# Patient Record
Sex: FEMALE | Race: WHITE | Employment: OTHER | URBAN - METROPOLITAN AREA
[De-identification: names, ages, dates, MRNs, and addresses within clinical notes are randomized per-mention and may not be internally consistent; named-entity substitution may affect disease eponyms.]

---

## 2021-02-19 ENCOUNTER — HOSPITAL ENCOUNTER (OUTPATIENT)
Dept: PHYSICAL THERAPY | Age: 61
Discharge: HOME | End: 2021-02-19
Payer: COMMERCIAL

## 2021-02-19 PROCEDURE — 97110 THERAPEUTIC EXERCISES: CPT

## 2021-02-19 PROCEDURE — 97165 OT EVAL LOW COMPLEX 30 MIN: CPT
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
## 2021-02-19 NOTE — PROGRESS NOTES
Ethan  : 1960  Primary: [place]  Secondary:  [address] at [address] [facility] 68, [address], [place], [facility] W [facility]  [phone]   [phone]      OUTPATIENT OCCUPATIONAL THERAPY: Daily Treatment Note 2021  Visit Count:  1    ICD-10: Treatment Diagnosis: Pain in left hand (H04.746)  Precautions/Allergies:   Patient has no allergy information on record. TREATMENT PLAN:  Effective Dates: 2021 TO 2021 (90 days). Frequency/Duration: 2 times a week for 90 Day(s)    Pre-treatment Symptoms/Complaints:    Pain: Initial: Pain Intensity 1: (severe)  Pain Location 1: Hand  Pain Orientation 1: Left  Post Session:  4/10   Medications Last Reviewed:  2021   Updated Objective Findings:  See evaluation note from today   TREATMENT:     Therapeutic Exercise: (  8 minutes):  Exercises per grid below to improve mobility, strength and coordination. Required minimal visual, verbal, manual and tactile cues to promote proper body alignment, promote proper body posture and promote proper body mechanics. Progressed resistance, range, repetitions and complexity of movement as indicated. Date:   Date:   Date:     Activity/Exercise Parameters Parameters Parameters   IP flexion extension 1. Individual fingers 1-2 sets 3-5 reps AROM. Straight fist 1-2 sets 3-5 reps AROM. Digit abduction adduction Little 1-2 sets 4-5 reps AROM. Home program: As above. [employer] Portal  Treatment/Session Summary:  Ethan tolerated the above exercises without significant complaints - she called later and said her pain was up to an 8 (from 4) and was encouraged to rest and complete exercises as tolerated. She was also encouraged to ice/rest/elevate her hand. Her small finger was macerated on the radial PIP joint due to giuliana taping, so she was also encouraged to Ml Loupe it out. \"  A thin tubular guaze was placed PATIENT [facility]  SHE SAYS HER RECORDS WAS SENT BUT THEY NEED A REFERRAL FORM.  SHE ALSON NEEDS A REFILL OF HER PAIN MEDICATION UNTIL SHE CAN GET IN TO SEE HIM THE END OF July.  SHE HAS BEEN TAKING PERCOCET 10/325, BUT IT MAKES HER SICK TO HER STOMACH.  SHE NEEDS A LOWER DOSAGE OR PHENEGREN  TO TAKE WITH IT.   over DIP/PIP joints to proximal phalanx. She was shown how to buddy tape her ring/little fingers with gauze. She was encouraged not to buddy tape her index and middle fingers as she did prior to today due to a stiff middle PIP joint. She was given an HEP with this therapist's e-mail address for further questions as needed. · Response to Treatment:  tolerated well without complications. · Communication/Consultation:  MD sent eval/patient called after treatment (see above). · Equipment provided today:  None today  · Recommendations/Intent for next treatment session: Next visit will focus on advancement to more challenging activities.     Total Treatment Billable Duration:  30 minutes  OT Patient Time In/Time Out  Time In: 0930  Time Out: 1015  Kareem Persaud OTR/L    Future Appointments   Date Time Provider Aleksander Hansen   2/23/2021  3:15 PM Akbar Santillan OTR/L [facility] SFE   2/25/2021  9:30 AM George Thurston, OTD, OTR/L [facility] SFE   3/1/2021  9:30 AM George Thurston, OTD, OTR/L [facility] SFD   3/5/2021  9:30 AM George Thurston, OTD, OTR/L [facility] SFD   3/8/2021  9:30 AM George Thurston, OTD, OTR/L [facility] SFD   3/12/2021  9:30 AM George Thurston, OTD, OTR/L [facility] SFD   3/17/2021  3:15 PM George Thurston, OTD, OTR/L [facility] SFD   3/19/2021  9:30 AM George Thurston, OTD, OTR/L [facility] SFD   3/22/2021  9:30 AM George Thurston, OTD, OTR/L [facility] SFD   3/26/2021  9:30 AM George Thurston, OTD, OTR/L [facility] SFD   3/29/2021  9:30 AM George Thurston, OTD, OTR/L [facility]

## 2021-02-23 ENCOUNTER — HOSPITAL ENCOUNTER (OUTPATIENT)
Dept: PHYSICAL THERAPY | Age: 61
Discharge: HOME | End: 2021-02-23
Payer: COMMERCIAL

## 2021-02-23 PROCEDURE — 97110 THERAPEUTIC EXERCISES: CPT

## 2021-02-23 PROCEDURE — 97140 MANUAL THERAPY 1/> REGIONS: CPT

## 2021-02-23 NOTE — PROGRESS NOTES
Ethan  : 1960  Primary:   Secondary:  [address]  at [facility]  [address], [place] [address] [address] [facility]  [phone]   [phone]      OUTPATIENT OCCUPATIONAL THERAPY: Daily Treatment Note 2021  Visit Count:  2    ICD-10: Treatment Diagnosis: Pain in left hand (C14.031)  Precautions/Allergies:   Patient has no allergy information on record. TREATMENT PLAN:  Effective Dates: 2021 TO 2021 (90 days). Frequency/Duration: 2 times a week for 90 Day(s)    Pre-treatment Symptoms/Complaints:    Pain: Initial: Pain Intensity 1: 2  Post Session:  \"good\"/10   Medications Last Reviewed:  2021   Updated Objective Findings:  See evaluation note from today   TREATMENT:   MODALITIES: (0-10 minutes): *  Hot Pack Therapy in order to provide analgesia and improve tissue extensibility in preparation for therapeutic exercises. Placed heat pack with 6-8 layers in between on dorsum of hand and assisted fingers into flexion. See below. Manual Therapy: (23 minutes): Soft tissue mobilization was utilized and necessary because of the patient's restricted joint motion and restricted motion of soft tissue. Completed gentle scar/retrograde massage secondary to hypersensitivity and edema. Ring, pinky and middle fingers were wrapped with co-band distal to proximally with 50% tension then placed around distal metacarpal to reduce edema. Therapeutic Exercise: (  30 minutes):  Exercises per grid below to improve mobility, strength and coordination. Required minimal visual, verbal, manual and tactile cues to promote proper body alignment, promote proper body posture and promote proper body mechanics. Progressed resistance, range, repetitions and complexity of movement as indicated. Date:   Date:   Date:     Activity/Exercise Parameters Parameters Parameters   IP flexion extension 1. Individual fingers 1-2 sets 3-5 reps AROM.  1. Individual fingers 3-4 sets 5-10 reps AROM. 2.     Reverse blocking   1. 3-4 sets 3-5 reps AROM. Gentle extrinsic finger extensor stretch  With heat 2-3 sets held 2-3 minutes. Straight fist 1-2 sets 3-5 reps AROM. Digit abduction adduction Little 1-2 sets 4-5 reps AROM. Composite 1-2 sets 4-5 reps AROM. Composite flexion extension   2-3 sets 5-10 reps AROM down to co-band roll (just cardboard). Home program: As above. [facility] Portal  Treatment/Session Summary:  Charissa Knight tolerated the above exercises without significant complaints. She is flexing her PIP joint of SF to at least 60° and was able to almost fully extend her ring finger by the end of the session (was at -40°). She was given a 'template' for composite flexion exercises and reverse blocking exercises were added to improve extension. Continue OT per plan of care. · Response to Treatment:  tolerated well without complications. · Communication/Consultation:  None today  · Equipment provided today:  Composite flexion 'template.'   · Recommendations/Intent for next treatment session: Next visit will focus on advancement to more challenging activities.     Total Treatment Billable Duration:  55 minutes  OT Patient Time In/Time Out  Time In: 8460  Time Out: 1610  Priya Sousa OTR/L    Future Appointments   Date Time Provider Aleksander Hansen   2/25/2021  9:30 AM Dee Ovalle OTD, OTR/L [facility] SFE   3/1/2021  9:30 AM CHARLIE Mera, OTR/L [facility] SFD   3/5/2021  9:30 AM Dee Ovalle OTD, OTR/L [facility] SFD   3/8/2021  9:30 AM Dee Ovalle OTVERO, OTR/L [facility] SFD   3/12/2021  9:30 AM eDe Ovalle OTD, OTR/L [facility] SFD   3/17/2021  3:15 PM Dee Ovalle OTD, OTR/L [facility] SFD   3/19/2021  9:30 AM Dee Ovalle OTD, OTR/L [facility] SFD   3/22/2021  9:30 [facility]   3/26/2021  9:30 AM [facility]era[facility]   3/29/2021  9:30 AM CHARLIE Mera, OTR/L [facility]

## 2021-02-25 ENCOUNTER — HOSPITAL ENCOUNTER (OUTPATIENT)
Dept: PHYSICAL THERAPY | Age: 61
Discharge: HOME | End: 2021-02-25
Payer: COMMERCIAL

## 2021-02-25 PROCEDURE — 97140 MANUAL THERAPY 1/> REGIONS: CPT

## 2021-02-25 PROCEDURE — 97110 THERAPEUTIC EXERCISES: CPT

## 2021-02-25 NOTE — PROGRESS NOTES
[place]  : 1960  Primary:   Secondary:  [address] at [address] [facility] 68, [address], [place], [address] [facility]  [phone]   [phone]      OUTPATIENT OCCUPATIONAL THERAPY: Daily Treatment Note 2021  Visit Count:  3    ICD-10: Treatment Diagnosis: Pain in left hand (F83.393)  Precautions/Allergies:   Patient has no allergy information on record. TREATMENT PLAN:  Effective Dates: 2021 TO 2021 (90 days). Frequency/Duration: 2 times a week for 90 Day(s)    Pre-treatment Symptoms/Complaints:    Pain: Initial: Pain Intensity 1: 2  Pain Location 1: Hand  Pain Orientation 1: Left  Post Session:  \"good\"/10   Medications Last Reviewed:  2021   Updated Objective Findings:  : SF: PIP flexion extension: 55/10; MCP: 60; SF proximal phalanx: 6.2 cmn   TREATMENT:   MODALITIES: (0-10 minutes): *  Hot Pack Therapy in order to provide analgesia and improve tissue extensibility in preparation for therapeutic exercises. Placed heat pack with 6-8 layers in between on dorsum of hand and assisted fingers into flexion. See below. Manual Therapy: (7 minutes): Soft tissue mobilization was utilized and necessary because of the patient's restricted joint motion and restricted motion of soft tissue. Completed gentle scar/retrograde massage secondary to hypersensitivity and edema. Ring, pinky and middle fingers were wrapped with co-band distal to proximally with 50% tension then placed around distal metacarpal to reduce edema. Therapeutic Exercise: (  23 minutes):  Exercises per grid below to improve mobility, strength and coordination. Required minimal visual, verbal, manual and tactile cues to promote proper body alignment, promote proper body posture and promote proper body mechanics. Progressed resistance, range, repetitions and complexity of movement as indicated.      Date:   Date:   Date:     Activity/Exercise Parameters Parameters Parameters   IP flexion extension 1. Individual fingers 1-2 sets 3-5 reps AROM. 1. Individual fingers 3-4 sets 5-10 reps AROM. 1. Individual fingers 1-2 sets 5-10 reps AROM. Reverse blocking   1. 3-4 sets 3-5 reps AROM. 1. 3-4 sets 3-5 reps AROM. Gentle extrinsic finger extensor stretch  With heat 2-3 sets held 2-3 minutes. With heat 2-3 sets held 2-3 minutes. Straight fist 1-2 sets 3-5 reps AROM. Digit abduction adduction Little 1-2 sets 4-5 reps AROM. Composite 1-2 sets 4-5 reps AROM. Composite 1-2 sets 4-5 reps AROM. Composite flexion extension   2-3 sets 5-10 reps AROM down to co-band roll (just cardboard). 2-3 sets 5-10 reps AROM down to co-band roll (down to highlighter)                     Home program: As above. [employer] Portal  Treatment/Session Summary:  Ashley Knight tolerated the above exercises without significant complaints. She is flexing her PIP joint of SF to at least 60-70° as needed for gripping for ADL. Continue OT per plan of care. · Response to Treatment:  tolerated well without complications. · Communication/Consultation:  None today  · Equipment provided today:  Composite flexion 'template.'   · Recommendations/Intent for next treatment session: Next visit will focus on advancement to more challenging activities.     Total Treatment Billable Duration:  36 minutes  OT Patient Time In/Time Out  Time In: 7940  Time Out: [address] [place] [address], OTD, OTR/L    Future Appointments   Date Time Provider Aleksander Hansen   3/1/2021  9:30 AM Karthikeyan Alegria OTR/L [facility] SFD   3/5/2021  9:30 AM Minerva Seton, OTD, OTR/L [facility] SFD   3/8/2021  9:30 AM Minerva Seton, OTD, OTR/L [facility] SFD   3/12/2021  9:30 AM Minerva Seton, OTD, OTR/L [facility] SFD   3/17/2021  3:15 PM Minerva Seton, OTD, OTR/L [facility] SFD   3/19/2021  9:30 AM Minerva Seton, OTD, OTR/L [facility]   3/22/2021  9:30 AM Minerva Seton, OTD, OTR/L [facility]   3/26/2021  9:30 AM Minerva Seton, OTD, OTR/L [facility]   3/29/2021  9:30 AM Olena Cerna OTVERO, OTR/L [facility]

## 2021-03-01 ENCOUNTER — HOSPITAL ENCOUNTER (OUTPATIENT)
Dept: PHYSICAL THERAPY | Age: 61
Discharge: HOME | End: 2021-03-01
Payer: COMMERCIAL

## 2021-03-01 PROCEDURE — 97110 THERAPEUTIC EXERCISES: CPT

## 2021-03-01 PROCEDURE — 97140 MANUAL THERAPY 1/> REGIONS: CPT

## 2021-03-01 NOTE — PROGRESS NOTES
Ethan  : 1960  Primary:   Secondary:  [address]  at [facility]  [address], [address], [place], [facility]  [phone]   [phone]      OUTPATIENT OCCUPATIONAL THERAPY: Daily Treatment Note 3/1/2021  Visit Count:  4    ICD-10: Treatment Diagnosis: Pain in left hand (B96.666)  Precautions/Allergies:   Patient has no allergy information on record. TREATMENT PLAN:  Effective Dates: 2021 TO 2021 (90 days). Frequency/Duration: 2 times a week for 90 Day(s)    Pre-treatment Symptoms/Complaints:    Pain: Initial: Pain Intensity 1: 0  Pain Location 1: Hand  Pain Orientation 1: Left  Post Session:  \"good\"/10   Medications Last Reviewed:  3/1/2021   Updated Objective Findings:  : RF DIP flexion extension: 40 PIP flexion extension: 80/20 SF: DIP flexion extension: 7 PIP flexion extension: 67/18; MCP: 61; SF proximal phalanx: 6.0 cm : SF: PIP flexion extension: 55/10; MCP: 60; SF proximal phalanx: 6.2 cmn   TREATMENT:   MODALITIES: (0-10 minutes): *  Hot Pack Therapy in order to provide analgesia and improve tissue extensibility in preparation for therapeutic exercises. Placed heat pack with 6-8 layers in between on dorsum of hand and assisted fingers into flexion. See below. Manual Therapy: (8 minutes): Soft tissue mobilization was utilized and necessary because of the patient's restricted joint motion and restricted motion of soft tissue. Completed gentle scar/retrograde massage secondary to hypersensitivity and edema. Ring, pinky and middle fingers were wrapped with co-band distal to proximally with 50% tension then placed around distal metacarpal to reduce edema. Therapeutic Exercise: (  30 minutes):  Exercises per grid below to improve mobility, strength and coordination. Required minimal visual, verbal, manual and tactile cues to promote proper body alignment, promote proper body posture and promote proper body mechanics. Progressed resistance, range, repetitions and complexity of movement as indicated. Date:  2/23 Date:  2/25 Date:  3/1   Activity/Exercise Parameters Parameters Parameters   IP flexion extension 1. Individual fingers 3-4 sets 5-10 reps AROM. 1. Individual fingers 1-2 sets 5-10 reps AROM. 1. Individual fingers 1-2 sets 5-10 reps AROM. Reverse blocking  1. 3-4 sets 3-5 reps AROM. 1. 3-4 sets 3-5 reps AROM. 1. 3-4 sets 3-5 reps AROM. Gentle extrinsic finger extensor stretch With heat 2-3 sets held 2-3 minutes. With heat 2-3 sets held 2-3 minutes. With heat 2-3 sets held 2-3 minutes. Straight fist   1. 3-4 sets 3-5 reps AROM. Digit abduction adduction Composite 1-2 sets 4-5 reps AROM. Composite 1-2 sets 4-5 reps AROM. Composite 1-2 sets 4-5 reps AROM   Composite flexion extension  2-3 sets 5-10 reps AROM down to co-band roll (just cardboard). 2-3 sets 5-10 reps AROM down to co-band roll (down to highlighter) 2-3 sets 5-10 reps AROM down to co-band roll   Finger blocking   PIP joint 1-2 sets 10-15 reps. Home program: As above. [employer] Portal  Treatment/Session Summary:  Claudia Knight tolerated the above exercises without significant complaints. Her PIP flexion contractures improve with exercise. Her pinky tip is getting to about 2 cm from her distal zhao crease. .  Continue OT per plan of care. · Response to Treatment:  tolerated well without complications. · Communication/Consultation:  None today  · Equipment provided today:  Composite flexion 'template.'   · Recommendations/Intent for next treatment session: Next visit will focus on advancement to more challenging activities.     Total Treatment Billable Duration:  38 minutes  OT Patient Time In/Time Out  Time In: 3305  Time Out: 6000 [facility] 98, OTD, OTR/L    Future Appointments   Date Time Provider Aleksander Hansen   3/8/2021  2:30 PM Sol Agarwal OTR/L [facility]   3/12/2021  1:45 PM CHARLIE Monique, OTR/L [facility]   3/17/2021  3:15 PM Newt Blazing, OTD, OTR/L [facility]   3/19/2021  3:15 PM Newt Blazing, OTD, OTR/L [facility]   3/22/2021  9:30 AM Newt Blazing, OTD, OTR/L [facility]   3/26/2021  9:30 AM Newt Blazing, OTD, OTR/L [facility]   3/26/2021 12:45 PM Snatiago Murphy MD [facility]   3/29/2021  9:30 AM Newt Blazing, OTD, OTR/L [facility]

## 2021-03-05 ENCOUNTER — APPOINTMENT (OUTPATIENT)
Dept: PHYSICAL THERAPY | Age: 61
End: 2021-03-05
Payer: COMMERCIAL

## 2021-03-08 ENCOUNTER — HOSPITAL ENCOUNTER (OUTPATIENT)
Dept: PHYSICAL THERAPY | Age: 61
Discharge: HOME | End: 2021-03-08
Payer: COMMERCIAL

## 2021-03-08 PROCEDURE — 97110 THERAPEUTIC EXERCISES: CPT

## 2021-03-08 PROCEDURE — 97140 MANUAL THERAPY 1/> REGIONS: CPT

## 2021-03-08 PROCEDURE — 97018 PARAFFIN BATH THERAPY: CPT

## 2021-03-08 NOTE — PROGRESS NOTES
[place]  : 1960  Primary:   Secondary:  [address]  at   [address], [address] [facility], [address]  [phone]   [phone]      OUTPATIENT OCCUPATIONAL THERAPY: Daily Treatment Note 3/8/2021  Visit Count:  5    ICD-10: Treatment Diagnosis: Pain in left hand (S00.650)  Precautions/Allergies:   Patient has no allergy information on record. TREATMENT PLAN:  Effective Dates: 2021 TO 2021 (90 days). Frequency/Duration: 2 times a week for 90 Day(s)    Pre-treatment Symptoms/Complaints:    Pain: Initial: Pain Intensity 1: 0  Pain Location 1: Hand  Pain Orientation 1: Left  Post Session:  \"good\"/10   Medications Last Reviewed:  3/8/2021   Updated Objective Findings:  3/8: RF  PIP flexion extension: 90 SF: DIP flexion extension: 11 PIP flexion extension: 72/18; MCP: 72; SF proximal phalanx: 6.1 cm   2: RF DIP flexion extension: 40 PIP flexion extension: 80/20 SF: DIP flexion extension: 7 PIP flexion extension: 67/18; MCP: 61; SF proximal phalanx: 6.0 cm : SF: PIP flexion extension: 55/10; MCP: 60; SF proximal phalanx: 6.2 cmn   TREATMENT:   MODALITIES: (0-10 minutes):      *  Cold Pack Therapy in order to provide analgesia and reduce inflammation and edema. *  Hot Pack Therapy in order to provide analgesia and improve tissue extensibility in preparation for therapeutic exercises. Placed heat pack with 6-8 layers in between on dorsum of hand and assisted fingers into flexion. See below. *  Paraffin Therapy in order to provide analgesia and improve tissue extensibility in preparation for therapeutic exercises. Placed hand in paraffin 3 times in preparation for therapeutic exercises. Patient states it felt good. Manual Therapy: (8 minutes): Soft tissue mobilization was utilized and necessary because of the patient's restricted joint motion and restricted motion of soft tissue.  Completed gentle scar/retrograde massage secondary to hypersensitivity and edema. Ring, pinky and middle fingers were wrapped with co-band distal to proximally with 50% tensio to reduce edema. Therapeutic Exercise: (  30 minutes):  Exercises per grid below to improve mobility, strength and coordination. Required minimal visual, verbal, manual and tactile cues to promote proper body alignment, promote proper body posture and promote proper body mechanics. Progressed resistance, range, repetitions and complexity of movement as indicated. Date:  2/25 Date:  3/1 Date:  3/8   Activity/Exercise Parameters Parameters Parameters   IP flexion extension 1. Individual fingers 1-2 sets 5-10 reps AROM. 1. Individual fingers 1-2 sets 5-10 reps AROM. Reverse blocking  1. 3-4 sets 3-5 reps AROM. 1. 3-4 sets 3-5 reps AROM. 1. 3-4 sets 3-5 reps AROM. Gentle extrinsic finger extensor stretch With heat 2-3 sets held 2-3 minutes. With heat 2-3 sets held 2-3 minutes. With heat 2-3 sets held 2-3 minutes. Straight fist  1. 3-4 sets 3-5 reps AROM. 1. 3-4 sets 3-5 reps AROM. Digit abduction adduction Composite 1-2 sets 4-5 reps AROM. Composite 1-2 sets 4-5 reps AROM 1. Between SF/RF tan theraputty 2-3 reps. Composite flexion extension  2-3 sets 5-10 reps AROM down to co-band roll (down to highlighter) 2-3 sets 5-10 reps AROM down to co-band roll 2-3 sets 5-10 reps AROM down to highlighter/marker/pen   Finger blocking  PIP joint 1-2 sets 10-15 reps. PIP joint 2-3 sets 5-10 reps. Home program: As above. [employer] Portal  Treatment/Session Summary:  Claudia Knight tolerated the above exercises without significant complaints. Her total AROM improved by 15-20° and she no longer demonstrates flexion contracture of ring finger since last visit. .  She continues to have swelling which limits her overall AROM. Continue OT per plan of care. · Response to Treatment:  tolerated well without complications.   · Communication/Consultation:  None today  · Equipment provided today:  None today  · Recommendations/Intent for next treatment session: Next visit will focus on advancement to more challenging activities.     Total Treatment Billable Duration:  45 minutes  OT Patient Time In/Time Out  Time In: 1430  Time Out: 1515  Ziggy Valdes OTR/L    Future Appointments   Date Time Provider Aleksander Jade   3/12/2021  1:45 PM Stephen Ng OTR/L [facility]   3/17/2021  3:15 PM Launie Klinefelter, OTD, OTR/L [facility]   3/19/2021  3:15 PM Launie Klinefelter, OTD, OTR/L [facility]   3/22/2021  9:30 AM Launie [facility]linefelter, OTD[facility]   3/26/2021  9:30 AM Launie Klinefelter, OTD, OTR/L [facility]   3/26/2021 12:45 PM [facility]   3/29/2021  9:30 AM Launie Klinefelter, OTD, OTR/L [facility]

## 2021-03-12 ENCOUNTER — HOSPITAL ENCOUNTER (OUTPATIENT)
Dept: PHYSICAL THERAPY | Age: 61
Discharge: HOME | End: 2021-03-12
Payer: COMMERCIAL

## 2021-03-12 PROCEDURE — 97110 THERAPEUTIC EXERCISES: CPT

## 2021-03-12 PROCEDURE — 97018 PARAFFIN BATH THERAPY: CPT

## 2021-03-12 NOTE — PROGRESS NOTES
Evon Knight  : 1960  Primary:   Secondary:  [facility] at [address], [address], [place] [address]  [phone]   [phone]      OUTPATIENT OCCUPATIONAL THERAPY: Daily Treatment Note 3/12/2021  Visit Count:  6    ICD-10: Treatment Diagnosis: Pain in left hand (M79.642)  Precautions/Allergies:   Patient has no allergy information on record.   TREATMENT PLAN:  Effective Dates: 2021 TO 2021 (90 days).  Frequency/Duration: 2 times a week for 90 Day(s)    Pre-treatment Symptoms/Complaints:    Pain: Initial: Pain Intensity 1: 0  Post Session:  \"good\"/10   Medications Last Reviewed:  3/12/2021   Updated Objective Findings:  3/8: RF  PIP flexion extension: 90 SF: DIP flexion extension: 11 PIP flexion extension: 72/18; MCP: 72; SF proximal phalanx: 6.1 cm   : RF DIP flexion extension: 40 PIP flexion extension: 80/20 SF: DIP flexion extension: 7 PIP flexion extension: 67/18; MCP: 61; SF proximal phalanx: 6.0 cm 2: SF: PIP flexion extension: 55/10; MCP: 60; SF proximal phalanx: 6.2 cmn   TREATMENT:   MODALITIES: (0-10 minutes):      *  Cold Pack Therapy in order to provide analgesia and reduce inflammation and edema.       *  Hot Pack Therapy in order to provide analgesia and improve tissue extensibility in preparation for therapeutic exercises.  Placed heat pack with 6-8 layers in between on dorsum of hand and assisted fingers into flexion.  See below.        *  Paraffin Therapy in order to provide analgesia and improve tissue extensibility in preparation for therapeutic exercises.  Placed hand in paraffin 3 times in preparation for therapeutic exercises.  Patient states it felt good.   Manual Therapy: (3 minutes): Soft tissue mobilization was utilized and necessary because of the patient's restricted joint motion and restricted motion of soft tissue. Completed gentle scar/retrograde massage secondary to hypersensitivity and edema.  Ring,  pinky and middle fingers were wrapped with co-band distal to proximally with 50% tensio to reduce edema. Therapeutic Exercise: (  25 minutes):  Exercises per grid below to improve mobility, strength and coordination. Required minimal visual, verbal, manual and tactile cues to promote proper body alignment, promote proper body posture and promote proper body mechanics. Progressed resistance, range, repetitions and complexity of movement as indicated. Date:  2/25 Date:  3/1 Date:  3/8 Date:  3/12   Activity/Exercise Parameters Parameters Parameters    IP flexion extension 1. Individual fingers 1-2 sets 5-10 reps AROM. 1. Individual fingers 1-2 sets 5-10 reps AROM. Reverse blocking  1. 3-4 sets 3-5 reps AROM. 1. 3-4 sets 3-5 reps AROM. 1. 3-4 sets 3-5 reps AROM. 1. 1-2 sets 3-5 reps AROM. Gentle extrinsic finger extensor stretch With heat 2-3 sets held 2-3 minutes. With heat 2-3 sets held 2-3 minutes. With heat 2-3 sets held 2-3 minutes. Straight fist  1. 3-4 sets 3-5 reps AROM. 1. 3-4 sets 3-5 reps AROM. 1. 1-2 sets 3-5 reps AROM. Digit abduction adduction Composite 1-2 sets 4-5 reps AROM. Composite 1-2 sets 4-5 reps AROM 1. Between SF/RF tan theraputty 2-3 reps. 1. Between SF/RF tan theraputty 1-2 sets 5-10 reps. Composite flexion extension  2-3 sets 5-10 reps AROM down to co-band roll (down to highlighter) 2-3 sets 5-10 reps AROM down to co-band roll 2-3 sets 5-10 reps AROM down to highlighter/marker/pen 1. AAROM 1-2 sets 2-3 minutes at end range. 2. 2-3 sets 5-10 reps down to wax   Finger blocking  PIP joint 1-2 sets 10-15 reps. PIP joint 2-3 sets 5-10 reps. 1. PIP joint 2-3 sets 5-10 reps. 2. DIP 1-2 sets 5-10 reps. Home program: As above. [employer] Portal  Treatment/Session Summary:  David Knight tolerated the above exercises without significant complaints. .  She continues to have swelling which limits her overall AROM. Continue OT per plan of care. · Response to Treatment:  tolerated well without complications. · Communication/Consultation:  None today  · Equipment provided today:  None today  · Recommendations/Intent for next treatment session: Next visit will focus on advancement to more challenging activities.     Total Treatment Billable Duration:  45 minutes  OT Patient Time In/Time Out  Time In: 1345  Time Out: 1430  Abby Preciado, OTR/L    Future Appointments   Date Time Provider Aleksander Jade   3/12/2021  1:45 PM Chery Stevenson, OTR/L [facility]   3/17/2021  3:15 PM Terress[facility] Hipps, [facility]TD[facility]   3/19/2021  3:15 PM Terressa Hipps, OTD, OTR/L [facility]   3/22/2021  9:30 AM Terressa Hipps, OTD, OTR/L [facility]   3/26/2021  9:30 AM Terressa Hipps, OTD, OTR/L [facility]   3/26/2021 12:45 PM Jeni Ontiveros MD [facility]   3/29/2021  9:30 AM Terressa Hipps, OTD, OTR/L [id number]

## 2021-03-17 ENCOUNTER — HOSPITAL ENCOUNTER (OUTPATIENT)
Dept: PHYSICAL THERAPY | Age: 61
Discharge: HOME | End: 2021-03-17
Payer: COMMERCIAL

## 2021-03-17 PROCEDURE — 97110 THERAPEUTIC EXERCISES: CPT

## 2021-03-17 NOTE — PROGRESS NOTES
Ethan  : 1960  Primary:   Secondary:  [address] at [address] [facility] 68, [address], [place], [address] [facility]  [phone]   [phone]      OUTPATIENT OCCUPATIONAL THERAPY: Daily Treatment Note 3/17/2021  Visit Count:  7    ICD-10: Treatment Diagnosis: Pain in left hand (U47.952)  Precautions/Allergies:   Patient has no allergy information on record. TREATMENT PLAN:  Effective Dates: 2021 TO 2021 (90 days). Frequency/Duration: 2 times a week for 90 Day(s)    Pre-treatment Symptoms/Complaints:  Patient states she didn't really fall off of a ladder. She states she fell out of the car door when her  sped up. She states she told him she wanted a divorce and states she has a safe place to go. Pain: Initial: Pain Intensity 1: 0  Post Session:  \"good\"/10   Medications Last Reviewed:  3/17/2021   Updated Objective Findings: 3/17: SF: DIP flexion extension: 20 PIP flexion extension: 75/7; MCP: 70; SF proximal phalanx: 6.0 cm  3/8: RF  PIP flexion extension: 90 SF: DIP flexion extension: 11 PIP flexion extension: 72/18; MCP: 72; SF proximal phalanx: 6.1 cm   : RF DIP flexion extension: 40 PIP flexion extension: 80/20 SF: DIP flexion extension: 7 PIP flexion extension: 67/18; MCP: 61; SF proximal phalanx: 6.0 cm : SF: PIP flexion extension: 55/10; MCP: 60; SF proximal phalanx: 6.2 cmn   TREATMENT:   MODALITIES: (0-10 minutes):      *  Cold Pack Therapy in order to provide analgesia and reduce inflammation and edema. *  Hot Pack Therapy in order to provide analgesia and improve tissue extensibility in preparation for therapeutic exercises. Placed heat pack with 6-8 layers in between on dorsum of hand and assisted fingers into flexion. See below. *  Paraffin Therapy in order to provide analgesia and improve tissue extensibility in preparation for therapeutic exercises.   Placed hand in paraffin 3 times in preparation for therapeutic exercises. Patient states it felt good. Manual Therapy: (5 minutes): Soft tissue mobilization was utilized and necessary because of the patient's restricted joint motion and restricted motion of soft tissue. Completed gentle scar/retrograde massage secondary to hypersensitivity and edema. Ring, pinky and middle fingers were wrapped with co-band distal to proximally with 50% tension to reduce edema. Therapeutic Exercise: (  23 minutes):  Exercises per grid below to improve mobility, strength and coordination. Required minimal visual, verbal, manual and tactile cues to promote proper body alignment, promote proper body posture and promote proper body mechanics. Progressed resistance, range, repetitions and complexity of movement as indicated. Date:  2/25 Date:  3/1 Date:  3/8 Date:  3/12 Date:  3/17   Activity/Exercise Parameters Parameters Parameters     IP flexion extension 1. Individual fingers 1-2 sets 5-10 reps AROM. 1. Individual fingers 1-2 sets 5-10 reps AROM. Reverse blocking  1. 3-4 sets 3-5 reps AROM. 1. 3-4 sets 3-5 reps AROM. 1. 3-4 sets 3-5 reps AROM. 1. 1-2 sets 3-5 reps AROM. 1. 1-2 sets 3-5 reps AROM. Gentle extrinsic finger extensor stretch With heat 2-3 sets held 2-3 minutes. With heat 2-3 sets held 2-3 minutes. With heat 2-3 sets held 2-3 minutes. Straight fist  1. 3-4 sets 3-5 reps AROM. 1. 3-4 sets 3-5 reps AROM. 1. 1-2 sets 3-5 reps AROM. Digit abduction adduction Composite 1-2 sets 4-5 reps AROM. Composite 1-2 sets 4-5 reps AROM 1. Between SF/RF tan theraputty 2-3 reps. 1. Between SF/RF tan theraputty 1-2 sets 5-10 reps. 1. Between SF/RF tan theraputty 1-2 sets 5-10 reps. Composite flexion extension  2-3 sets 5-10 reps AROM down to co-band roll (down to highlighter) 2-3 sets 5-10 reps AROM down to co-band roll 2-3 sets 5-10 reps AROM down to highlighter/marker/pen 1. AAROM 1-2 sets 2-3 minutes at end range. 2. 2-3 sets 5-10 reps down to wax 1.  TERRI 1-2 sets 5-10 reps. 2. 2-3 sets 5-10 reps down to tan theraputty. Finger blocking  PIP joint 1-2 sets 10-15 reps. PIP joint 2-3 sets 5-10 reps. 1. PIP joint 2-3 sets 5-10 reps. 2. DIP 1-2 sets 5-10 reps. 1. PIP joint 2-3 sets 5-10 reps. 2. DIP 1-2 sets 5-10 reps   ORL stretch     DIP 1-2 sets 2-3 minutes                   Home program: As above. [employer] Portal  Treatment/Session Summary:  Alec Knight tolerated the above exercises without significant complaints. Her swelling is now 6.0 cm versus 6.1 which limits her overall AROM. She is still roughty 3 cm from distal zhao crease and ability to make a full fist. Continue OT per plan of care. · Response to Treatment:  tolerated well without complications. · Communication/Consultation:  None today  · Equipment provided today:  None today  · Recommendations/Intent for next treatment session: Next visit will focus on advancement to more challenging activities.     Total Treatment Billable Duration:  30 minutes  OT Patient Time In/Time Out  Time In: 0098  Time Out: 1600  Romeo Cevallos OTR/L    Future Appointments   Date Time Provider Aleksander Hansen   3/17/2021  3:15 PM KAVITA Champagne/L [facility]   3/19/2021  3:15 PM CHARLIE Oconnell OTR/L [facility]   3/22/2021  9:30 AM CHARLIE Oconnell OTR/L [facility]   3/26/2021  9:30 AM CHARLIE Oconnell OTR/L [facility]   3/26/2021 12:45 PM Juan Mccray MD [facility]   3/29/2021  9:30 AM CHARLIE Oconnell OTR/L [facility]

## 2021-03-19 ENCOUNTER — HOSPITAL ENCOUNTER (OUTPATIENT)
Dept: PHYSICAL THERAPY | Age: 61
Discharge: HOME | End: 2021-03-19
Payer: COMMERCIAL

## 2021-03-19 PROCEDURE — 97110 THERAPEUTIC EXERCISES: CPT
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
## 2021-03-19 NOTE — PROGRESS NOTES
Ethan  : 1960  Primary:   Secondary:  [address]  at [facility]  [address], [address], [place], [facility] [address] [facility]  [phone]   [phone]      OUTPATIENT OCCUPATIONAL THERAPY: Daily Treatment Note 3/19/2021  Visit Count:  8    ICD-10: Treatment Diagnosis: Pain in left hand (J27.576)  Precautions/Allergies:   Patient has no allergy information on record. TREATMENT PLAN:  Effective Dates: 2021 TO 2021 (90 days). Frequency/Duration: 2 times a week for 90 Day(s)    Pre-treatment Symptoms/Complaints:  Patient states she wonders why her pinky is crossing over ring. Pain: Initial:    Post Session:  \"good\"/10   Medications Last Reviewed:  3/19/2021   Updated Objective Findings: 3/19: see progress note. 3/17: SF: DIP flexion extension: 20 PIP flexion extension: 75/7; MCP: 70; SF proximal phalanx: 6.0 cm  3/8: RF  PIP flexion extension: 90 SF: DIP flexion extension: 11 PIP flexion extension: 72/18; MCP: 72; SF proximal phalanx: 6.1 cm   225: RF DIP flexion extension: 40 PIP flexion extension: 80/20 SF: DIP flexion extension: 7 PIP flexion extension: 67/18; MCP: 61; SF proximal phalanx: 6.0 cm 225: SF: PIP flexion extension: 55/10; MCP: 60; SF proximal phalanx: 6.2 cmn   TREATMENT:   MODALITIES: (0-10 minutes):      *  Cold Pack Therapy in order to provide analgesia and reduce inflammation and edema. *  Hot Pack Therapy in order to provide analgesia and improve tissue extensibility in preparation for therapeutic exercises. Placed heat pack with 6-8 layers in between on dorsum of hand and assisted fingers into flexion. See below. *  Paraffin Therapy in order to provide analgesia and improve tissue extensibility in preparation for therapeutic exercises. Placed hand in paraffin 3 times in preparation for therapeutic exercises. Patient states it felt good. Manual Therapy: (5 minutes):  Soft tissue mobilization was utilized and necessary because of the patient's restricted joint motion and restricted motion of soft tissue. Completed gentle scar/retrograde massage secondary to hypersensitivity and edema. Ring, pinky and middle fingers were wrapped with co-band distal to proximally with 50% tension to reduce edema. Therapeutic Exercise: (  38 minutes):  Exercises per grid below to improve mobility, strength and coordination. Required minimal visual, verbal, manual and tactile cues to promote proper body alignment, promote proper body posture and promote proper body mechanics. Progressed resistance, range, repetitions and complexity of movement as indicated. Date:  3/19   Activity/Exercise    IP flexion extension 1. Individual fingers 1-2 sets 5-10 reps AROM. Reverse blocking  1. 1-2 sets 5-10 reps AROM. Gentle extrinsic finger extensor stretch    Straight fist 1. 1-2 sets 10-15 reps AROM. Digit abduction adduction 1. Between SF/RF yellow theraputty 1-2 sets 5-10 reps. Composite flexion extension  1. AAROM 1-2 sets 5-10 reps. 2. 2-3 sets 5-10 reps down to yellow theraputty. Finger blocking 1. PIP joint 2-3 sets 5-10 reps. 2. DIP 1-2 sets 5-10 reps   ORL stretch DIP 1-2 sets 2-3 minutes           Home program: As above. [employer] Portal  Treatment/Session Summary:  Reynold Knight tolerated the above exercises without significant complaints. Her swelling is now 6.0 cm versus 6.1 which limits her overall AROM. She is still roughty 3 cm from distal zhao crease and ability to make a full fist. Continue OT per plan of care. · Response to Treatment:  tolerated well without complications. · Communication/Consultation:  None today  · Equipment provided today:  None today  · Recommendations/Intent for next treatment session: Next visit will focus on advancement to more challenging activities.     Total Treatment Billable Duration:  45 minutes  OT Patient Time In/Time Out  Time In: 7411  Time Out: JAVIER Claros 9 CHARLIE Swanson, OTR/L    Future Appointments   Date Time Provider Aleksander Jade   3/22/2021  7:00 PM Carlin Cruz OTR/L [facility]   3/26/2021 12:45 PM Ananth Harman MD [facility]   3/29/2021  9:30 AM KAVITA Owens/L [facility]   4/1/2021  3:15 PM CHARLIE Blount OTR/L [facility]

## 2021-03-22 ENCOUNTER — APPOINTMENT (OUTPATIENT)
Dept: PHYSICAL THERAPY | Age: 61
End: 2021-03-22
Payer: COMMERCIAL

## 2021-03-26 ENCOUNTER — APPOINTMENT (OUTPATIENT)
Dept: PHYSICAL THERAPY | Age: 61
End: 2021-03-26
Payer: COMMERCIAL

## 2021-03-26 PROBLEM — S62.627B OPEN DISPLACED FRACTURE OF MIDDLE PHALANX OF LEFT LITTLE FINGER: Status: ACTIVE | Noted: 2021-03-26

## 2021-03-29 ENCOUNTER — HOSPITAL ENCOUNTER (OUTPATIENT)
Dept: PHYSICAL THERAPY | Age: 61
Discharge: HOME | End: 2021-03-29
Payer: COMMERCIAL

## 2021-03-29 PROCEDURE — 97018 PARAFFIN BATH THERAPY: CPT

## 2021-03-29 PROCEDURE — 97110 THERAPEUTIC EXERCISES: CPT

## 2021-03-29 NOTE — PROGRESS NOTES
Ethan  : 1960  Primary:   Secondary:  [address]  at [facility]  [address] [facility], [address] [place] [address], [place], [facility] [address] [facility]  [phone]   [phone]      OUTPATIENT OCCUPATIONAL THERAPY: Daily Treatment Note 3/29/2021  Visit Count:  9    ICD-10: Treatment Diagnosis: Pain in left hand (Z92.650)  Precautions/Allergies:   Patient has no allergy information on record. TREATMENT PLAN:  Effective Dates: 2021 TO 2021 (90 days). Frequency/Duration: 2 times a week for 90 Day(s)    Pre-treatment Symptoms/Complaints:  Patient states she wonders why her pinky is crossing over ring. Pain: Initial: Pain Intensity 1: 4  Pain Location 1: Hand  Pain Orientation 1: Left  Post Session:  \"good\"/10   Medications Last Reviewed:  3/29/2021   Updated Objective Findings: 3/29: SF: DIP flexion extension: 10 PIP flexion extension: 67/15; MCP: 7l0; SF proximal phalanx: 6.0 cm. 3/17: SF: DIP flexion extension: 20 PIP flexion extension: 75/7; MCP: 70; SF proximal phalanx: 6.0 cm  3/8: RF  PIP flexion extension: 90 SF: DIP flexion extension: 11 PIP flexion extension: 72/18; MCP: 72; SF proximal phalanx: 6.1 cm   : RF DIP flexion extension: 40 PIP flexion extension: 80/20 SF: DIP flexion extension: 7 PIP flexion extension: 67/18; MCP: 61; SF proximal phalanx: 6.0 cm : SF: PIP flexion extension: 55/10; MCP: 60; SF proximal phalanx: 6.2 cmn   TREATMENT:   MODALITIES: (0-10 minutes):      *  Cold Pack Therapy in order to provide analgesia and reduce inflammation and edema. *  Hot Pack Therapy in order to provide analgesia and improve tissue extensibility in preparation for therapeutic exercises. Placed heat pack with 6-8 layers in between on dorsum of hand and assisted fingers into flexion. See below. *  Paraffin Therapy in order to provide analgesia and improve tissue extensibility in preparation for therapeutic exercises.   Placed hand in paraffin 3 times in preparation for therapeutic exercises. Patient states it felt good. Therapeutic Exercise: (  38 minutes):  Exercises per grid below to improve mobility, strength and coordination. Required minimal visual, verbal, manual and tactile cues to promote proper body alignment, promote proper body posture and promote proper body mechanics. Progressed resistance, range, repetitions and complexity of movement as indicated. Date:  3/19 Date:  3/29   Activity/Exercise     IP flexion extension 1. Individual fingers 1-2 sets 5-10 reps AROM. Reverse blocking  1. 1-2 sets 5-10 reps AROM. Hook fist  1. 1-2 sets 10-15 reps AROM. 2. From fist AAROM 1-2 sets 10-15 reps. Straight fist 1. 1-2 sets 10-15 reps AROM. 1. 1-2 sets 10-15 reps AROM. Digit abduction adduction 1. Between SF/RF yellow theraputty 1-2 sets 5-10 reps. Composite flexion extension  1. AAROM 1-2 sets 5-10 reps. 2. 2-3 sets 5-10 reps down to yellow theraputty. 1. Co-band wrap with paraffin 2 sets held 5 minutes each. 2. 2-3 sets 5-10 reps (1-2 sets with theraputty target). Finger blocking 1. PIP joint 2-3 sets 5-10 reps. 2. DIP 1-2 sets 5-10 reps 1. PIP joint 1-2 sets 5-10 reps. 2. DIP 1-2 sets 5-10 reps   ORL stretch DIP 1-2 sets 2-3 minutes              Home program: As above. [employer] Portal  Treatment/Session Summary:  Chente Knight tolerated the above exercises without significant complaints. She was able to flex her PIP joint to 75° post session. Continue OT per plan of care. · Response to Treatment:  tolerated well without complications. · Communication/Consultation:  Plan to e-mail Dr. Luciano Hughes regarding dynamic flexion orthosis  · Equipment provided today:  None today  · Recommendations/Intent for next treatment session: Next visit will focus on advancement to more challenging activities.     Total Treatment Billable Duration:  45 minutes  OT Patient Time In/Time Out  Time In: 0930  Time Out: One CHARLIE Conrad, OTR/L    Future Appointments   Date Time Provider Aleksander Jade   4/1/2021  3:15 PM Kimberley Simmons OTR/L [facility]   5/28/2021 11:00 AM Carmen Quinonez MD [facility]

## 2021-04-01 ENCOUNTER — HOSPITAL ENCOUNTER (OUTPATIENT)
Dept: PHYSICAL THERAPY | Age: 61
Discharge: HOME | End: 2021-04-01
Payer: COMMERCIAL

## 2021-04-01 PROCEDURE — 97760 ORTHOTIC MGMT&TRAING 1ST ENC: CPT

## 2021-04-01 PROCEDURE — 97018 PARAFFIN BATH THERAPY: CPT

## 2021-04-01 NOTE — PROGRESS NOTES
Ethan  : 1960  Primary:   Secondary:  [address] at [address] [facility] [address], [address], [place], [address] [facility]  [phone]   [phone]      OUTPATIENT OCCUPATIONAL THERAPY: Daily Treatment Note 2021  Visit Count:  10    ICD-10: Treatment Diagnosis: Pain in left hand (M67.570)  Precautions/Allergies:   Patient has no allergy information on record. TREATMENT PLAN:  Effective Dates: 2021 TO 2021 (90 days). Frequency/Duration: 2 times a week for 90 Day(s)    Pre-treatment Symptoms/Complaints:  Patient states she wonders why her pinky is crossing over ring. Pain: Initial:    Post Session:  \"good\"/10   Medications Last Reviewed:  2021   Updated Objective Findings: 3/29: SF: DIP flexion extension: 10 PIP flexion extension: 67/15; MCP: 7l0; SF proximal phalanx: 6.0 cm. 3/17: SF: DIP flexion extension: 20 PIP flexion extension: 75/7; MCP: 70; SF proximal phalanx: 6.0 cm  3/8: RF  PIP flexion extension: 90 SF: DIP flexion extension: 11 PIP flexion extension: 72/18; MCP: 72; SF proximal phalanx: 6.1 cm   2: RF DIP flexion extension: 40 PIP flexion extension: 80/20 SF: DIP flexion extension: 7 PIP flexion extension: 67/18; MCP: 61; SF proximal phalanx: 6.0 cm : SF: PIP flexion extension: 55/10; MCP: 60; SF proximal phalanx: 6.2 cmn   TREATMENT:   MODALITIES: (0-10 minutes):      *  Cold Pack Therapy in order to provide analgesia and reduce inflammation and edema. *  Hot Pack Therapy in order to provide analgesia and improve tissue extensibility in preparation for therapeutic exercises. Placed heat pack with 6-8 layers in between on dorsum of hand and assisted fingers into flexion. See below. *  Paraffin Therapy in order to provide analgesia and improve tissue extensibility in preparation for therapeutic exercises. Placed hand in paraffin 3 times in preparation for therapeutic exercises. Patient states it felt good. Therapeutic Exercise: (  5 minutes):  Exercises per grid below to improve mobility, strength and coordination. Required minimal visual, verbal, manual and tactile cues to promote proper body alignment, promote proper body posture and promote proper body mechanics. Progressed resistance, range, repetitions and complexity of movement as indicated. Date:  3/19 Date:  3/29 Date:  4/1   Activity/Exercise      IP flexion extension 1. Individual fingers 1-2 sets 5-10 reps AROM. Reverse blocking  1. 1-2 sets 5-10 reps AROM. Hook fist  1. 1-2 sets 10-15 reps AROM. 2. From fist AAROM 1-2 sets 10-15 reps. Straight fist 1. 1-2 sets 10-15 reps AROM. 1. 1-2 sets 10-15 reps AROM. Digit abduction adduction 1. Between SF/RF yellow theraputty 1-2 sets 5-10 reps. Composite flexion extension  1. AAROM 1-2 sets 5-10 reps. 2. 2-3 sets 5-10 reps down to yellow theraputty. 1. Co-band wrap with paraffin 2 sets held 5 minutes each. 2. 2-3 sets 5-10 reps (1-2 sets with theraputty target). Small finger held in flexion with paraffin 2 sets held 5 minutes each. Finger blocking 1. PIP joint 2-3 sets 5-10 reps. 2. DIP 1-2 sets 5-10 reps 1. PIP joint 1-2 sets 5-10 reps. 2. DIP 1-2 sets 5-10 reps    ORL stretch DIP 1-2 sets 2-3 minutes                 Home program: As above. Orthotic Management/Training: (35 minutes): This hand - right orthotic is being utilized in order to increase patient's functional independence. Patient/caregiver educated to proper application and appropriate use of this orthotic and the patient benefits from this orthotic by achieving increased independence with tasks and achieving improved flexibility for index  PIP/DIP flexion in prep for ADL. A pattern for a hand based finger flexion orthotic was completed proximal from proximal wrist crease proximal to PIP volar crease. 1/8\" thermoplastic material heated then pattern cut with slit for thumb hole.   Heated material molded to R hand through thumb hole around thenar eminence and ulnar/radial flaps coming around dorsally around hypthenar/thenar eminences then flaps pressed together. Thumb manoplint material flared/rolled back around thenar eminence to allow thumb motion. A piece of thermoplastic material ~3\" by 3\" was cut, heated, then placed from volar portion of mid P3 dorsally then pressing material together. After removing from finger proximal edge was cut to allow proximal/distal interphalangeal joint flexion. Also, a hole was punched at the top portion then a dynamic stretch string was threaded through the anchor hole then through the orfit tubing with cap. Next a rubber band was placed through the holes and a line of pull toward the scahphoid was ensured fixating it to the base of orthosis with another strip of themoplastic material to encourage PIP/DIP flexion, but allow for PIP/DIP extension. Patient was instructed to wear for as long as 30-60 minutes if tolerated a couple times a day to provide a dynamic stretch of PIP joint and to encourage DIP flexion. Patient able to don/doff independently and verbalized understanding of purpose of orthosis. No significant areas of erythema noted after doffing. [employer] Portal  Treatment/Session Summary:  Balbina Parker received new PIP/DIP flexion orthosis to improve PIP/DIP flexion. Continue OT per plan of care. · Response to Treatment:  tolerated well without complications. · Communication/Consultation:  None today  · Equipment provided today:  Dynamic small finger PIP/DIP flexion orthosis  · Recommendations/Intent for next treatment session: Next visit will focus on advancement to more challenging activities.     Total Treatment Billable Duration:  45 minutes  OT Patient Time In/Time Out  Time In: 9079  Time Out: 1600  KAVITA Gonzales/L    Future Appointments   Date Time Provider Aleksander Hansen   4/1/2021  3:15 PM KAVITA Avelar/L [facility] MIGUEL ÁNGEL   4/6/2021  2:30 PM Elizabeth Bautista July Hurtado, OTD, OTR/L [facility] SFE   4/8/2021  2:30 PM Norm Going, OTD, OTR/L [facility]   4/12/2021  3:15 PM Norm Going, OTD, OTR/L [facility] SFD   4/14/2021  1:45 PM Norm Going, OTD, OTR/L [facility] SFD   4/20/2021  1:45 PM Norm Going, OTD, OTR/L [facility]   4/22/2021  1:00 PM Norm Going, OTD, OTR/L [facility] SFE   5/28/2021 11:00 AM Amanda Bain [facility]

## 2021-04-06 ENCOUNTER — HOSPITAL ENCOUNTER (OUTPATIENT)
Dept: PHYSICAL THERAPY | Age: 61
Discharge: HOME | End: 2021-04-06
Payer: COMMERCIAL

## 2021-04-06 PROCEDURE — 97018 PARAFFIN BATH THERAPY: CPT

## 2021-04-06 PROCEDURE — 97110 THERAPEUTIC EXERCISES: CPT

## 2021-04-06 PROCEDURE — 97763 ORTHC/PROSTC MGMT SBSQ ENC: CPT

## 2021-04-06 NOTE — PROGRESS NOTES
Ethan  : 1960  Primary:   Secondary:  [address]  at [facility]  [address], [address], [place], [facility] [address] [facility]  [phone]   [phone]      OUTPATIENT OCCUPATIONAL THERAPY: Daily Treatment Note 2021  Visit Count:  11    ICD-10: Treatment Diagnosis: Pain in left hand (Q87.222)  Precautions/Allergies:   Patient has no allergy information on record. TREATMENT PLAN:  Effective Dates: 2021 TO 2021 (90 days). Frequency/Duration: 2 times a week for 90 Day(s)    Pre-treatment Symptoms/Complaints:  Patient states she wonders why her pinky is crossing over ring. Pain: Initial:    Post Session:  \"good\"/10   Medications Last Reviewed:  2021   Updated Objective Findings: : SF: DIP flexion extension: 15 PIP flexion extension: 75/15; SF proximal phalanx: 6.0 cm. 3/29: SF: DIP flexion extension: 10 PIP flexion extension: 67/15; MCP: 7l0; SF proximal phalanx: 6.0 cm. 3/17: SF: DIP flexion extension: 20 PIP flexion extension: 75/7; MCP: 70; SF proximal phalanx: 6.0 cm  3/8: RF  PIP flexion extension: 90 SF: DIP flexion extension: 11 PIP flexion extension: 72/18; MCP: 72; SF proximal phalanx: 6.1 cm   : RF DIP flexion extension: 40 PIP flexion extension: 80/20 SF: DIP flexion extension: 7 PIP flexion extension: 67/18; MCP: 61; SF proximal phalanx: 6.0 cm : SF: PIP flexion extension: 55/10; MCP: 60; SF proximal phalanx: 6.2 cmn   TREATMENT:   MODALITIES: (0-10 minutes):      *  Cold Pack Therapy in order to provide analgesia and reduce inflammation and edema. *  Paraffin Therapy in order to provide analgesia and improve tissue extensibility in preparation for therapeutic exercises. Placed hand in paraffin 3 times in preparation for therapeutic exercises. Patient states it felt good. Therapeutic Exercise: (  22 minutes):  Exercises per grid below to improve mobility, strength and coordination.   Required minimal visual, verbal, manual and tactile cues to promote proper body alignment, promote proper body posture and promote proper body mechanics. Progressed resistance, range, repetitions and complexity of movement as indicated. Date:  3/19 Date:  3/29 Date:  4/1 Date:  4/6   Activity/Exercise       IP flexion extension 1. Individual fingers 1-2 sets 5-10 reps AROM. Reverse blocking  1. 1-2 sets 5-10 reps AROM. Hook fist  1. 1-2 sets 10-15 reps AROM. 2. From fist AAROM 1-2 sets 10-15 reps. Straight fist 1. 1-2 sets 10-15 reps AROM. 1. 1-2 sets 10-15 reps AROM. Digit abduction adduction 1. Between SF/RF yellow theraputty 1-2 sets 5-10 reps. Composite flexion extension  1. AAROM 1-2 sets 5-10 reps. 2. 2-3 sets 5-10 reps down to yellow theraputty. 1. Co-band wrap with paraffin 2 sets held 5 minutes each. 2. 2-3 sets 5-10 reps (1-2 sets with theraputty target). Small finger held in flexion with paraffin 2 sets held 5 minutes each. Small finger held in flexion with paraffin 2 sets held 5 minutes each   Finger blocking 1. PIP joint 2-3 sets 5-10 reps. 2. DIP 1-2 sets 5-10 reps 1. PIP joint 1-2 sets 5-10 reps. 2. DIP 1-2 sets 5-10 reps  1. PIP joint A: 1-2 sets 5-10 reps. B: 1-2 sets 5-10 reps. 2. DIP 1-2 sets 5-10 reps   ORL stretch DIP 1-2 sets 2-3 minutes   DIP 1-2 sets 2-3 minutes   Digi-flex    1. Index, middle, ring, and pinky  individually 1-2 sets 4-5 reps. Digit-extension        Home program: As above. Orthotic Management/Training Subsequent Session: (8 minutes): This hand - right orthotic is being utilized in order to increase patient's functional independence. Patient/caregiver educated to proper application and appropriate use of this orthotic and the patient benefits from this orthotic by achieving increased independence with tasks and achieving improved flexibility for index  PIP/DIP flexion in prep for ADL. Patient brought in her orthosis and states the rubber dynamic portion of it detatched. The 3\" by 3\" thermoplastic material re-heated. Next a rubber band was placed through the holes and a line of pull toward the scaphoid was ensured fixating it to the base of orthosis with another strip of themoplastic material to encourage PIP/DIP flexion, but allow for PIP/DIP extension. Patient was instructed to wear for as long as 3 sets  minutes if tolerated a couple times a day to provide a dynamic stretch of PIP joint and to encourage DIP flexion. Patient able to don/doff independently and verbalized understanding of purpose of orthosis. No significant areas of erythema noted after doffing. [employer] Portal  Treatment/Session Summary:  Kenny Gilman gained 8° in PIP flexion and 5° DIP flexion for gripping as needed for ADL. Continue OT per plan of care. · Response to Treatment:  tolerated well without complications. · Communication/Consultation:  None today  · Equipment provided today:  Dynamic small finger PIP/DIP flexion orthosis  · Recommendations/Intent for next treatment session: Next visit will focus on advancement to more challenging activities.     Total Treatment Billable Duration:  45 minutes  OT Patient Time In/Time Out  Time In: 1430  Time Out: 1515  Alfredo Peña OTR/L    Future Appointments   Date Time Provider Aleksander Hansen   4/6/2021  2:30 PM Cordella Lemme, OTD, OTR/L [facility] SFE   4/8/2021  2:30 PM Cordella Lemme, OTD, OTR/L [facility]   4/12/2021  3:15 PM Cordella Lemme, OTD, OTR/L [facility] SFD   4/14/2021  1:45 PM Cordella Lemme, OTD, OTR/L [facility] SFD   4/20/2021  1:45 PM Cordella Lemme, OTD, OTR/L [facility]   4/22/2021  1:00 PM Cordella Lemme, OTD, OTR/L [facility]   5/28/2021 11:00 AM MD MINESH Hicks

## 2021-04-08 ENCOUNTER — HOSPITAL ENCOUNTER (OUTPATIENT)
Dept: PHYSICAL THERAPY | Age: 61
Discharge: HOME | End: 2021-04-08
Payer: COMMERCIAL

## 2021-04-08 PROCEDURE — 97110 THERAPEUTIC EXERCISES: CPT

## 2021-04-08 NOTE — PROGRESS NOTES
Ethan  : 1960  Primary:   Secondary:  [address]  at [facility]  [address], [address] [place] [address], [place], [address] [place] [address]  [phone]   [phone]      OUTPATIENT OCCUPATIONAL THERAPY: Daily Treatment Note 2021  Visit Count:  12    ICD-10: Treatment Diagnosis: Pain in left hand (T96.506)  Precautions/Allergies:   Patient has no allergy information on record. TREATMENT PLAN:  Effective Dates: 2021 TO 2021 (90 days). Frequency/Duration: 2 times a week for 90 Day(s)    Pre-treatment Symptoms/Complaints:  Patient states she has been wearing her dynamic flexion orthosis. Pain: Initial: Pain Intensity 1: 0  Post Session:  3/10   Medications Last Reviewed:  2021   Updated Objective Findings: : SF: DIP flexion extension: 20 PIP flexion extension: 70/20; SF proximal phalanx: 6.0 cm. : SF: DIP flexion extension: 15 PIP flexion extension: 75/15; SF proximal phalanx: 6.0 cm. 3/29: SF: DIP flexion extension: 10 PIP flexion extension: 67/15; MCP: 7l0; SF proximal phalanx: 6.0 cm. 3/17: SF: DIP flexion extension: 20 PIP flexion extension: 75/7; MCP: 70; SF proximal phalanx: 6.0 cm  3/8: RF  PIP flexion extension: 90 SF: DIP flexion extension: 11 PIP flexion extension: 72/18; MCP: 72; SF proximal phalanx: 6.1 cm   : RF DIP flexion extension: 40 PIP flexion extension: 80/20 SF: DIP flexion extension: 7 PIP flexion extension: 67/18; MCP: 61; SF proximal phalanx: 6.0 cm : SF: PIP flexion extension: 55/10; MCP: 60; SF proximal phalanx: 6.2 cmn   TREATMENT:   MODALITIES: (0-10 minutes):      *  Cold Pack Therapy in order to provide analgesia and reduce inflammation and edema. *  Paraffin Therapy in order to provide analgesia and improve tissue extensibility in preparation for therapeutic exercises. Placed hand in paraffin 3 times in preparation for therapeutic exercises. Patient states it felt good.    Therapeutic Exercise: (  38 minutes): Exercises per grid below to improve mobility, strength and coordination. Required minimal visual, verbal, manual and tactile cues to promote proper body alignment, promote proper body posture and promote proper body mechanics. Progressed resistance, range, repetitions and complexity of movement as indicated. Date:  3/19 Date:  3/29 Date:  4/1 Date:  4/6 Date:  4/8   Activity/Exercise        Fluidotherapy     X 15-20 reps AROM    IP flexion extension 1. Individual fingers 1-2 sets 5-10 reps AROM. Reverse blocking  1. 1-2 sets 5-10 reps AROM. Hook fist  1. 1-2 sets 10-15 reps AROM. 2. From fist AAROM 1-2 sets 10-15 reps. Straight fist 1. 1-2 sets 10-15 reps AROM. 1. 1-2 sets 10-15 reps AROM. Digit abduction adduction 1. Between SF/RF yellow theraputty 1-2 sets 5-10 reps. 1. Between SF/RF yellow theraputty 1-2 sets 5-10 reps   Composite flexion extension  1. AAROM 1-2 sets 5-10 reps. 2. 2-3 sets 5-10 reps down to yellow theraputty. 1. Co-band wrap with paraffin 2 sets held 5 minutes each. 2. 2-3 sets 5-10 reps (1-2 sets with theraputty target). Small finger held in flexion with paraffin 2 sets held 5 minutes each. Small finger held in flexion with paraffin 2 sets held 5 minutes each 2-3 sets 5-10 reps down to orange theraputty. Finger blocking 1. PIP joint 2-3 sets 5-10 reps. 2. DIP 1-2 sets 5-10 reps 1. PIP joint 1-2 sets 5-10 reps. 2. DIP 1-2 sets 5-10 reps  1. PIP joint A: 1-2 sets 5-10 reps. B: 1-2 sets 5-10 reps. 2. DIP 1-2 sets 5-10 reps 1. PIP joint A: 2-3 sets 5-10 reps. 2. DIP 1-2 sets 5-10 reps   ORL stretch DIP 1-2 sets 2-3 minutes   DIP 1-2 sets 2-3 minutes DIP 1-2 sets 2-3 minutes   Digi-flex    1. Index, middle, ring, and pinky  individually 1-2 sets 4-5 reps. Home program: As above. [employer] Portal  Treatment/Session Summary:  Rosalea Cogan gained  5° DIP flexion since last session as needed for gripping as needed for ADL.   She got up to 30° DIP flexion and 75° PIP flexion by the end of the session. Continue OT per plan of care. · Response to Treatment:  tolerated well without complications. · Communication/Consultation:  None today  · Equipment provided today:  None today  · Recommendations/Intent for next treatment session: Next visit will focus on advancement to more challenging activities.     Total Treatment Billable Duration:  45 minutes  OT Patient Time In/Time Out  Time In: 1430  Time Out: 1515  Gabo Trejo OTR/L    Future Appointments   Date Time Provider Aleksander Hansen   4/8/2021  2:30 PM Matt Shea OTR/L [facility] SFE   4/12/2021  3:15 PM Bethanne Primmer, OTD, OTR/L [facility] SFD   4/14/2021  1:45 PM Bethanne Primmer, OTD, OTR/L [facility] SFD   4/20/2021  1:45 PM Bethanne Primmer, OTD, OTR/L [facility]   4/22/2021  1:00 PM Bethanne Primmer, OTD, OTR/L [facility] E   5/28/2021 11:00 AM Aroldo Yan MD [facility]

## 2021-04-12 ENCOUNTER — HOSPITAL ENCOUNTER (OUTPATIENT)
Dept: PHYSICAL THERAPY | Age: 61
Discharge: HOME | End: 2021-04-12
Payer: COMMERCIAL

## 2021-04-12 PROCEDURE — 97110 THERAPEUTIC EXERCISES: CPT

## 2021-04-12 NOTE — PROGRESS NOTES
Ethan  : 1960  Primary:   Secondary:  [address]  at [facility]  [address], [address], [place], [facility]  [phone]   [phone]      OUTPATIENT OCCUPATIONAL THERAPY: Daily Treatment Note 2021  Visit Count:  13    ICD-10: Treatment Diagnosis: Pain in left hand (S70.537)  Precautions/Allergies:   Patient has no allergy information on record. TREATMENT PLAN:  Effective Dates: 2021 TO 2021 (90 days). Frequency/Duration: 2 times a week for 90 Day(s)    Pre-treatment Symptoms/Complaints:  Patient states she has been wearing her dynamic flexion orthosis. Pain: Initial: Pain Intensity 1: 0  Post Session:  3/10   Medications Last Reviewed:  2021   Updated Objective Findings: : SF: DIP flexion extension: 15 PIP flexion extension: 69/15; SF proximal phalanx: NT : SF: DIP flexion extension: 20 PIP flexion extension: 70/20; SF proximal phalanx: 6.0 cm. : SF: DIP flexion extension: 15 PIP flexion extension: 75/15; SF proximal phalanx: 6.0 cm. 3/29: SF: DIP flexion extension: 10 PIP flexion extension: 67/15; MCP: 7l0; SF proximal phalanx: 6.0 cm. 3/17: SF: DIP flexion extension: 20 PIP flexion extension: 75/7; MCP: 70; SF proximal phalanx: 6.0 cm  3/8: RF  PIP flexion extension: 90 SF: DIP flexion extension: 11 PIP flexion extension: 72/18; MCP: 72; SF proximal phalanx: 6.1 cm   : RF DIP flexion extension: 40 PIP flexion extension: 80/20 SF: DIP flexion extension: 7 PIP flexion extension: 67/18; MCP: 61; SF proximal phalanx: 6.0 cm : SF: PIP flexion extension: 55/10; MCP: 60; SF proximal phalanx: 6.2 cmn   TREATMENT:   MODALITIES: (0-10 minutes):      *  Cold Pack Therapy in order to provide analgesia and reduce inflammation and edema. Therapeutic Exercise: (  38 minutes):  Exercises per grid below to improve mobility, strength and coordination.   Required minimal visual, verbal, manual and tactile cues to promote proper body alignment, promote proper body posture and promote proper body mechanics. Progressed resistance, range, repetitions and complexity of movement as indicated. Date:  4/6 Date:  4/8 Date:  4/12   Activity/Exercise      Fluidotherapy  X 15-20 reps AROM  X 15-20 reps AROM    Hook fist   1. 1-2 sets 10-15 reps AROM. 2. From fist AAROM 1-2 sets 10-15 reps. Digit abduction adduction  1. Between SF/RF yellow theraputty 1-2 sets 5-10 reps    Composite flexion extension  Small finger held in flexion with paraffin 2 sets held 5 minutes each 2-3 sets 5-10 reps down to orange theraputty. Small finger held in flexion held 5 minutes each   Finger blocking 1. PIP joint A: 1-2 sets 5-10 reps. B: 1-2 sets 5-10 reps. 2. DIP 1-2 sets 5-10 reps 1. PIP joint A: 2-3 sets 5-10 reps. 2. DIP 1-2 sets 5-10 reps 1. PIP joint A: 2-3 sets 5-10 reps. 2. DIP 1-2 sets 5-10 reps   ORL stretch DIP 1-2 sets 2-3 minutes DIP 1-2 sets 2-3 minutes DIP 1-2 sets 2-3 minutes   Digi-flex 1. Index, middle, ring, and pinky  individually 1-2 sets 4-5 reps. 1. Index, middle, ring, and pinky  individually 1-2 sets 5-10 reps. Home program: As above. [employer] Portal  Treatment/Session Summary:  Kaylyn Yoo gained  10° DIP flexion by the end of the session. She was encouraged to complete functional activities like \"paper crumple\" and grasping \"beans\" at home. Continue OT per plan of care. · Response to Treatment:  tolerated well without complications. · Communication/Consultation:  None today  · Equipment provided today:  None today  · Recommendations/Intent for next treatment session: Next visit will focus on advancement to more challenging activities.     Total Treatment Billable Duration:  45 minutes  OT Patient Time In/Time Out  Time In: 5222  Time Out: 1600  Ashlyn Lung, OTR/L    Future Appointments   Date Time Provider Aleksander Hansen   4/12/2021  3:15 PM Jared Sanchez OTR/L [facility]   4/14/2021  1:45 PM Andree Mathew, CHARLIE Garcia, OTR/L [facility] SFD   4/20/2021  1:45 PM CHARLIE Joe, OTR/L [facility] SFE   4/22/2021  1:00 PM CHARLIE Joe, OTR/L [facility] SFE   5/28/2021 11:00 AM Carmen Quinonez MD [facility]

## 2021-04-14 ENCOUNTER — HOSPITAL ENCOUNTER (OUTPATIENT)
Dept: PHYSICAL THERAPY | Age: 61
Discharge: HOME | End: 2021-04-14
Payer: COMMERCIAL

## 2021-04-14 PROCEDURE — 97018 PARAFFIN BATH THERAPY: CPT

## 2021-04-14 PROCEDURE — 97110 THERAPEUTIC EXERCISES: CPT

## 2021-04-14 PROCEDURE — 97763 ORTHC/PROSTC MGMT SBSQ ENC: CPT
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
## 2021-04-20 ENCOUNTER — HOSPITAL ENCOUNTER (OUTPATIENT)
Dept: PHYSICAL THERAPY | Age: 61
Discharge: HOME | End: 2021-04-20
Payer: COMMERCIAL

## 2021-04-20 PROCEDURE — 97110 THERAPEUTIC EXERCISES: CPT

## 2021-04-20 PROCEDURE — 97763 ORTHC/PROSTC MGMT SBSQ ENC: CPT

## 2021-04-20 NOTE — PROGRESS NOTES
Ethan  : 1960  Primary:   Secondary:  [address]  at [facility]  [address], [address] [facility], [address]  [phone]   [phone]      OUTPATIENT OCCUPATIONAL THERAPY: Daily Treatment Note 2021  Visit Count:  15    ICD-10: Treatment Diagnosis: Pain in left hand (Y77.151)  Precautions/Allergies:   Patient has no allergy information on record. TREATMENT PLAN:  Effective Dates: 2021 TO 2021 (90 days). Frequency/Duration: 2 times a week for 90 Day(s)    Pre-treatment Symptoms/Complaints:  Patient states she is having some numbness in her ulnar portion of her hand  Pain: Initial: Pain Intensity 1: 2  Pain Location 1: Hand  Pain Orientation 1: Left  Post Session:  3/10   Medications Last Reviewed:  2021   Updated Objective Findings:   : See progress note. : SF: DIP flexion extension: 15 PIP flexion extension: 69/15; SF proximal phalanx: NT  : RF DIP flexion extension: 40 PIP flexion extension: 80/20 SF: DIP flexion extension: 7 PIP flexion extension: 67/18; MCP: 61; SF proximal phalanx: 6.0 cm : SF: PIP flexion extension: 55/10; MCP: 60; SF proximal phalanx: 6.2 cmn   TREATMENT:   MODALITIES: (0-10 minutes):      *  Cold Pack Therapy in order to provide analgesia and reduce inflammation and edema. MODALITIES: (0 minutes): *  Paraffin Therapy in order to provide analgesia and improve tissue extensibility in preparation for therapeutic exercises. Therapeutic Exercise: (  24 minutes):  Exercises per grid below to improve mobility, strength and coordination. Required minimal visual, verbal, manual and tactile cues to promote proper body alignment, promote proper body posture and promote proper body mechanics. Progressed resistance, range, repetitions and complexity of movement as indicated.      Date:   Date:   Date:     Activity/Exercise      Fluidotherapy X 15-20 reps AROM   X 15-20 reps hand AROM Hook fist 1. 1-2 sets 10-15 reps AROM. 2. From fist AAROM 1-2 sets 10-15 reps. Composite flexion extension  Small finger held in flexion held 5 minutes each 1. Small finger held in flexion with paraffin 2 sets held 5 minutes each. 2. Small finger down to palm with wax \"target\" 2-3 sets 5-10 reps. 1. Green theraputty 2-3 sets 5-10 reps. 2. Small finger down to palm with green theraputty \"target\" 2-3 sets 5-10 reps. Finger blocking 1. PIP joint A: 2-3 sets 5-10 reps. 2. DIP 1-2 sets 5-10 reps 1. PIP joint A: 2-3 sets 5-10 reps. 2. DIP 2-3 sets 5-10 reps    ORL stretch DIP 1-2 sets 2-3 minutes     Digi-flex 1. Index, middle, ring, and pinky  individually 1-2 sets 5-10 reps. Home program: As above. Orthotic Management/Training Subsequent Session: (15 minutes): This hand - right orthotic is being utilized in order to increase patient's functional independence. Patient/caregiver educated to proper application and appropriate use of this orthotic and the patient benefits from this orthotic by achieving increased independence with tasks and achieving improved flexibility for index  PIP/DIP flexion in prep for ADL.   Patient's dynamic small finger orthosis distal anchor was noted to have deformed and also the ulnar edge of the hand orthosis was limited extension of the small finger. Patient's orthosis was altered to allow small finger MCP extension by cutting the ulnar edge of the orthosis, spot heating it and rolling it downward. The distal anchor (thermoplastic material) was spot heated, then placed from dorsal portion P3 then a dynamic stretch string was re-threaded (initially unthreaded to heat distal anchor) through the anchor holes as well as the thermoplastic piece for the DIP.   Next an elastic band was placed through the holes and a line of pull toward the scahphoid was ensured fixating it to the base of orthosis with another strip of themoplastic material to encourage MCP/PIP/DIP flexion, but also allow for some MCP/PIP/DIP extension. Patient was instructed to wear for as long as 30-60 minutes if tolerated a couple times a day to provide a dynamic stretch of PIP joint and to encourage MCP/PIP/DIP flexion. Patient able to don/doff independently and verbalized understanding of purpose of orthosis. [employer] Portal  Treatment/Session Summary:  Niru Knight's DIP is flexing to 30° (versus 20°) last week. And her distal pinky tip is only 2 cm from her distal zhao crease (see progress note for details) as needed for functional composite girpping. Continue OT per plan of care. · Response to Treatment:  tolerated well without complications. · Communication/Consultation:  None today  · Equipment provided today:  None today  · Recommendations/Intent for next treatment session: Next visit will focus on advancement to more challenging activities.     Total Treatment Billable Duration:  45 minutes  OT Patient Time In/Time Out  Time In: 8652  Time Out: 1430  Toi Velasquez OTR/L    Future Appointments   Date Time Provider Aleksander Hansen   4/22/2021  7:00 PM Vaibhav Flanagan OTR/L [facility]   4/27/2021  1:00 PM CHARLIE Murray OTR/L [facility]   4/29/2021  1:45 PM CHARLIE Murray, JOSEFAR/L [facility]   6/4/2021 11:00 AM MD MINESH Zuleta
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
## 2021-04-22 ENCOUNTER — APPOINTMENT (OUTPATIENT)
Dept: PHYSICAL THERAPY | Age: 61
End: 2021-04-22
Payer: COMMERCIAL

## 2021-04-27 ENCOUNTER — HOSPITAL ENCOUNTER (OUTPATIENT)
Dept: PHYSICAL THERAPY | Age: 61
Discharge: HOME | End: 2021-04-27
Payer: COMMERCIAL

## 2021-04-27 PROCEDURE — 97110 THERAPEUTIC EXERCISES: CPT

## 2021-04-27 NOTE — PROGRESS NOTES
Ethan  : 1960  Primary:   Secondary:  [address] at [facility]  [facility], [address] [place] [address], [place], [address] [place] [address]  [phone]   [phone]      OUTPATIENT OCCUPATIONAL THERAPY: Daily Treatment Note 2021  Visit Count:  16    ICD-10: Treatment Diagnosis: Pain in left hand (X81.138)  Precautions/Allergies:   Patient has no allergy information on record. TREATMENT PLAN:  Effective Dates: 2021 TO 2021 (90 days). Frequency/Duration: 2 times a week for 90 Day(s)    Pre-treatment Symptoms/Complaints:  Patient states she's bending her finger better. Pain: Initial: Pain Intensity 1: 1  Pain Location 1: Hand  Pain Orientation 1: Left  Post Session:  3/10   Medications Last Reviewed:  2021   Updated Objective Findings:   : SF: DIP flexion extension: 30 PIP flexion extension: 70/20;   : SF: DIP flexion extension: 15 PIP flexion extension: 69/15; SF proximal phalanx: NT  : RF DIP flexion extension: 40 PIP flexion extension: 80/20 SF: DIP flexion extension: 7 PIP flexion extension: 67/18; MCP: 61; SF proximal phalanx: 6.0 cm : SF: PIP flexion extension: 55/10; MCP: 60; SF proximal phalanx: 6.2 cmn   TREATMENT:   MODALITIES: (0-10 minutes):      *  Cold Pack Therapy in order to provide analgesia and reduce inflammation and edema. MODALITIES: (0 minutes): *  Paraffin Therapy in order to provide analgesia and improve tissue extensibility in preparation for therapeutic exercises. Therapeutic Exercise: (  53 minutes):  Exercises per grid below to improve mobility, strength and coordination. Required minimal visual, verbal, manual and tactile cues to promote proper body alignment, promote proper body posture and promote proper body mechanics. Progressed resistance, range, repetitions and complexity of movement as indicated.      Date:   Date:   Date:   Date:     Activity/Exercise       Fluidotherapy X 15-20 reps AROM X 15-20 reps hand AROM  X 15-20 reps hand AROM    Hook fist 1. 1-2 sets 10-15 reps AROM. 2. From fist AAROM 1-2 sets 10-15 reps. Composite flexion extension  Small finger held in flexion held 5 minutes each 1. Small finger held in flexion with paraffin 2 sets held 5 minutes each. 2. Small finger down to palm with wax \"target\" 2-3 sets 5-10 reps. 1. Green theraputty 2-3 sets 5-10 reps. 2. Small finger down to palm with green theraputty \"target\" 2-3 sets 5-10 reps. 1. Putty press holding marker  pinky/ring green theraputty 1-2 sets 10-15 reps. 2. Small finger down to palm with green theraputty \"target\" 2-3 sets 5-10 reps. Finger blocking 1. PIP joint A: 2-3 sets 5-10 reps. 2. DIP 1-2 sets 5-10 reps 1. PIP joint A: 2-3 sets 5-10 reps. 2. DIP 2-3 sets 5-10 reps  1. PIP joint using wooden block A: 1-2 sets 5-10 reps. B: 1-2 sets 5-10 reps green theraputty target. C: finger block 1-2 sets 5-10 reps AROM. ORL stretch DIP 1-2 sets 2-3 minutes   DIP 1-2 sets 2-3 minutes          Digi-flex 1. Index, middle, ring, and pinky  individually 1-2 sets 5-10 reps. Home program: As above. [employer] Portal  Treatment/Session Summary:  Nancy Ventura DIP is flexing to 35° following today's session as needed for functional composite girpping. Continue OT per plan of care. · Response to Treatment:  tolerated well without complications. · Communication/Consultation:  None today  · Equipment provided today:  None today  · Recommendations/Intent for next treatment session: Next visit will focus on advancement to more challenging activities.     Total Treatment Billable Duration:  55 minutes  OT Patient Time In/Time Out  Time In: 8654  Time Out: 1400  [employer]a [employer], OTR/L    Future Appointments   Date Time Provider Aleksander Hansen   4/29/2021  1:45 PM Elyse Arriaga, OTR/L [facility]   6/4/2021 11:00 AM MD MINESH Hilario

## 2021-04-29 ENCOUNTER — HOSPITAL ENCOUNTER (OUTPATIENT)
Dept: PHYSICAL THERAPY | Age: 61
Discharge: HOME | End: 2021-04-29
Payer: COMMERCIAL

## 2021-04-29 PROCEDURE — 97110 THERAPEUTIC EXERCISES: CPT

## 2021-04-29 NOTE — PROGRESS NOTES
Ethan  : 1960  Primary:   Secondary:  [address]  at [facility]  [address], [address], [address], [facility] [address] [facility]  [phone]   [phone]      OUTPATIENT OCCUPATIONAL THERAPY: Daily Treatment Note 2021  Visit Count:  17    ICD-10: Treatment Diagnosis: Pain in left hand (K97.711)  Precautions/Allergies:   Patient has no allergy information on record. TREATMENT PLAN:  Effective Dates: 2021 TO 2021 (90 days). Frequency/Duration: 2 times a week for 90 Day(s)    Pre-treatment Symptoms/Complaints:  Patient states she's bending her finger better. Pain: Initial: Pain Intensity 1: 1  Pain Location 1: Hand  Pain Orientation 1: Left  Post Session:  3/10   Medications Last Reviewed:  2021   Updated Objective Findings:   : SF: DIP flexion extension: 30 PIP flexion extension: 70/20;   : SF: DIP flexion extension: 15 PIP flexion extension: 69/15; SF proximal phalanx: NT  : RF DIP flexion extension: 40 PIP flexion extension: 80/20 SF: DIP flexion extension: 7 PIP flexion extension: 67/18; MCP: 61; SF proximal phalanx: 6.0 cm : SF: PIP flexion extension: 55/10; MCP: 60; SF proximal phalanx: 6.2 cmn   TREATMENT:   MODALITIES: (0-10 minutes):      *  Cold Pack Therapy in order to provide analgesia and reduce inflammation and edema. MODALITIES: (0 minutes): *  Paraffin Therapy in order to provide analgesia and improve tissue extensibility in preparation for therapeutic exercises. Therapeutic Exercise: (  38 minutes):  Exercises per grid below to improve mobility, strength and coordination. Required minimal visual, verbal, manual and tactile cues to promote proper body alignment, promote proper body posture and promote proper body mechanics. Progressed resistance, range, repetitions and complexity of movement as indicated.      Date:   Date:   Date:   Date:   Date:     Activity/Exercise        Fluidotherapy X 15-20 reps AROM   X 15-20 reps hand AROM  X 15-20 reps hand AROM  X 15-20 reps hand AROM    Hook fist 1. 1-2 sets 10-15 reps AROM. 2. From fist AAROM 1-2 sets 10-15 reps. Composite flexion extension  Small finger held in flexion held 5 minutes each 1. Small finger held in flexion with paraffin 2 sets held 5 minutes each. 2. Small finger down to palm with wax \"target\" 2-3 sets 5-10 reps. 1. Green theraputty 2-3 sets 5-10 reps. 2. Small finger down to palm with green theraputty \"target\" 2-3 sets 5-10 reps. 1. Putty press holding marker  pinky/ring green theraputty 1-2 sets 10-15 reps. 2. Small finger down to palm with green theraputty \"target\" 2-3 sets 5-10 reps. 1. Yellow ball 1-2 sets 5-10 reps. 2. Putty press holding marker  pinky/ring green theraputty 1-2 sets 10-15 reps. 3. Small finger down to palm with green theraputty \"target\" 2-3 sets 5-10 reps. 4. 1-2 sets 5-10 sets A/AAROM. Finger blocking 1. PIP joint A: 2-3 sets 5-10 reps. 2. DIP 1-2 sets 5-10 reps 1. PIP joint A: 2-3 sets 5-10 reps. 2. DIP 2-3 sets 5-10 reps  1. PIP joint using wooden block A: 1-2 sets 5-10 reps. B: 1-2 sets 5-10 reps green theraputty target. C: finger block 1-2 sets 5-10 reps AROM. 1. PIP joint using wooden block A: 1-2 sets 5-10 reps. B: 1-2 sets 5-10 reps green theraputty target. C: finger block 1-2 sets 5-10 reps AROM. ORL stretch DIP 1-2 sets 2-3 minutes   DIP 1-2 sets 2-3 minutes DIP 3-4 sets held 15-20 seconds. Digi-flex 1. Index, middle, ring, and pinky  individually 1-2 sets 5-10 reps. Pinky 1.5 lbs 1-2 sets 5-10 reps. Home program: As above. [employer] Portal  Treatment/Session Summary:  Katie GALE is flexing to 40° following today's session as needed for functional composite gripping. Able to touch palm via straight fist. Continue OT per plan of care. · Response to Treatment:  tolerated well without complications.   · Communication/Consultation:  None today  · Equipment provided today:  None today  · Recommendations/Intent for next treatment session: Next visit will focus on advancement to more challenging activities.     Total Treatment Billable Duration:  40 minutes  OT Patient Time In/Time Out  Time In: 1350  Time Out: 1430  Juan Thao OTR/L    Future Appointments   Date Time Provider Aleksander Hansen   5/4/2021  2:30 PM Antonio Jose OTR/L [facility]   5/6/2021  1:45 PM CHARLIE Swanson, OTR/L [facility]   6/4/2021 11:00 AM Svitlana Mena MD [facility]

## 2021-05-04 ENCOUNTER — HOSPITAL ENCOUNTER (OUTPATIENT)
Dept: PHYSICAL THERAPY | Age: 61
Discharge: HOME | End: 2021-05-04
Payer: COMMERCIAL

## 2021-05-04 PROCEDURE — 97110 THERAPEUTIC EXERCISES: CPT

## 2021-05-04 NOTE — PROGRESS NOTES
Ethan  : 1960  Primary:   Secondary:  [address] at [address] [facility], [address], [place], [address] [facility]  [phone]   [phone]      OUTPATIENT OCCUPATIONAL THERAPY: Daily Treatment Note 2021  Visit Count:  18    ICD-10: Treatment Diagnosis: Pain in left hand (W85.709)  Precautions/Allergies:   Patient has no allergy information on record. TREATMENT PLAN:  Effective Dates: 2021 TO 2021 (90 days). Frequency/Duration: 2 times a week for 90 Day(s)    Pre-treatment Symptoms/Complaints:  Patient states she's bending her finger better. Pain: Initial: Pain Intensity 1: 1  Pain Location 1: Hand  Pain Orientation 1: Left  Post Session:  3/10   Medications Last Reviewed:  2021   Updated Objective Findings:   : SF: DIP flexion extension: 35 PIP flexion extension: 75/20;   : SF: DIP flexion extension: 30 PIP flexion extension: 70/20;   : SF: DIP flexion extension: 15 PIP flexion extension: 69/15; SF proximal phalanx: NT  : RF DIP flexion extension: 40 PIP flexion extension: 80/20 SF: DIP flexion extension: 7 PIP flexion extension: 67/18; MCP: 61; SF proximal phalanx: 6.0 cm : SF: PIP flexion extension: 55/10; MCP: 60; SF proximal phalanx: 6.2 cmn   TREATMENT:   MODALITIES: (0-10 minutes):      *  Cold Pack Therapy in order to provide analgesia and reduce inflammation and edema. MODALITIES: (0 minutes): *  Paraffin Therapy in order to provide analgesia and improve tissue extensibility in preparation for therapeutic exercises. Therapeutic Exercise: (  38 minutes):  Exercises per grid below to improve mobility, strength and coordination. Required minimal visual, verbal, manual and tactile cues to promote proper body alignment, promote proper body posture and promote proper body mechanics. Progressed resistance, range, repetitions and complexity of movement as indicated.      Date:   Date:   Date:   Activity/Exercise      Fluidotherapy X 15-20 reps hand AROM  X 15-20 reps hand AROM  X 15-20 reps hand AROM   Hook fist   Using blocking device 2 sets 10-15 reps. Composite flexion extension  1. Putty press holding marker  pinky/ring green theraputty 1-2 sets 10-15 reps. 2. Small finger down to palm with green theraputty \"target\" 2-3 sets 5-10 reps. 1. Yellow ball 1-2 sets 5-10 reps. 2. Putty press holding marker  pinky/ring green theraputty 1-2 sets 10-15 reps. 3. Small finger down to palm with green theraputty \"target\" 2-3 sets 5-10 reps. 4. 1-2 sets 5-10 sets A/AAROM. 1. 1-2 sets 5-10 reps AROM. 2. Pink rubber ball 1-2 sets 10-15 reps. 3.  Putty press holding marker  pinky/ring green theraputty 1-2 sets 10-15 reps. 4. Small finger down to palm with orange theraputty \"target\" 2-3 sets 5-10 reps   Finger blocking 1. PIP joint using wooden block A: 1-2 sets 5-10 reps. B: 1-2 sets 5-10 reps green theraputty target. C: finger block 1-2 sets 5-10 reps AROM. 1. PIP joint using wooden block A: 1-2 sets 5-10 reps. B: 1-2 sets 5-10 reps green theraputty target. C: finger block 1-2 sets 5-10 reps AROM. ORL stretch DIP 1-2 sets 2-3 minutes DIP 3-4 sets held 15-20 seconds. Digi-flex  Pinky 1.5 lbs 1-2 sets 5-10 reps. Pinky 1.5 lbs 1-2 sets 10-15 reps. Home program: As above. [employer]  Treatment/Session Summary:  Zeeshan Bar PIP/DIP is flexing 10° more than last week as needed for functional composite gripping for ADL/instrumental ADL. Able to touch palm via straight fist. Continue OT per plan of care. · Response to Treatment:  tolerated well without complications. · Communication/Consultation:  None today  · Equipment provided today:  None today  · Recommendations/Intent for next treatment session: Next visit will focus on advancement to more challenging activities.     Total Treatment Billable Duration:  45 minutes  OT Patient Time In/Time Out  Time In: 1430  Time Out: CHARLIE Pandey, OTR/L    Future Appointments   Date Time Provider Aleksander Hansen   5/4/2021  2:30 PM Noelle Marc OTR/L [facility]   5/6/2021  1:45 PM CHARLIE Singh, OTR/L [facility]   6/4/2021 11:00 AM Mary Barnett MD [facility]

## 2021-05-06 ENCOUNTER — HOSPITAL ENCOUNTER (OUTPATIENT)
Dept: PHYSICAL THERAPY | Age: 61
Discharge: HOME | End: 2021-05-06
Payer: COMMERCIAL

## 2021-05-06 PROCEDURE — 97110 THERAPEUTIC EXERCISES: CPT

## 2021-05-06 PROCEDURE — 97018 PARAFFIN BATH THERAPY: CPT

## 2021-05-06 NOTE — PROGRESS NOTES
Ethan  : 1960  Primary:   Secondary:  [address]  at [facility]  [address], [address], [place], [facility]  [phone]   [phone]      OUTPATIENT OCCUPATIONAL THERAPY: Daily Treatment Note 2021  Visit Count:  19    ICD-10: Treatment Diagnosis: Pain in left hand (R87.039)  Precautions/Allergies:   Patient has no allergy information on record. TREATMENT PLAN:  Effective Dates: 2021 TO 2021 (90 days). Frequency/Duration: 2 times a week for 90 Day(s)    Pre-treatment Symptoms/Complaints:  Patient states she's bending her finger better. Pain: Initial: Pain Intensity 1: 0  Pain Location 1: Hand  Pain Orientation 1: Left  Post Session:  3-4/10   Medications Last Reviewed:  2021   Updated Objective Findings:   : SF: DIP flexion extension: 35 PIP flexion extension: 75/20;   : SF: DIP flexion extension: 30 PIP flexion extension: 70/20;   : SF: DIP flexion extension: 15 PIP flexion extension: 69/15; SF proximal phalanx: NT  : RF DIP flexion extension: 40 PIP flexion extension: 80/20 SF: DIP flexion extension: 7 PIP flexion extension: 67/18; MCP: 61; SF proximal phalanx: 6.0 cm : SF: PIP flexion extension: 55/10; MCP: 60; SF proximal phalanx: 6.2 cmn   TREATMENT:   MODALITIES: (0-10 minutes):      *  Cold Pack Therapy in order to provide analgesia and reduce inflammation and edema. MODALITIES: (5 minutes): *  Paraffin Therapy in order to provide analgesia and improve tissue extensibility in preparation for therapeutic exercises. Therapeutic Exercise: (  23 minutes):  Exercises per grid below to improve mobility, strength and coordination. Required minimal visual, verbal, manual and tactile cues to promote proper body alignment, promote proper body posture and promote proper body mechanics. Progressed resistance, range, repetitions and complexity of movement as indicated.      Date:   Date:   Date:   Activity/Exercise      Fluidotherapy X 15-20 reps hand AROM  X 15-20 reps hand AROM    Hook fist  Using blocking device 2 sets 10-15 reps. Using blocking device 2 sets 10-15 reps. Composite flexion extension  1. Yellow ball 1-2 sets 5-10 reps. 2. Putty press holding marker  pinky/ring green theraputty 1-2 sets 10-15 reps. 3. Small finger down to palm with green theraputty \"target\" 2-3 sets 5-10 reps. 4. 1-2 sets 5-10 sets A/AAROM. 1. 1-2 sets 5-10 reps AROM. 2. Pink rubber ball 1-2 sets 10-15 reps. 3.  Putty press holding marker  pinky/ring green theraputty 1-2 sets 10-15 reps. 4. Small finger down to palm with orange theraputty \"target\" 2-3 sets 5-10 reps 1. 1-2 sets 10-15 reps AROM. 2. 1-2 sets 10-15 reps wax \"target. \"  3. Transporting beads from one bowl to another using composite flexion (focusing on ulnar portion of hand). 4. Gripping dowel with velcro pull/push 1-2 sets 10-15 reps. Finger blocking 1. PIP joint using wooden block A: 1-2 sets 5-10 reps. B: 1-2 sets 5-10 reps green theraputty target. C: finger block 1-2 sets 5-10 reps AROM. ORL stretch DIP 3-4 sets held 15-20 seconds. DIP 3-4 sets held 30-60 seconds. Digi-flex Pinky 1.5 lbs 1-2 sets 5-10 reps. Pinky 1.5 lbs 1-2 sets 10-15 reps. Home program: As above. [employer] Portal  Treatment/Session Summary:  Sagrario GALE is flexing to 40° now more as needed for functional composite gripping for ADL/instrumental ADL. Able to touch palm via straight fist. Continue OT per plan of care. · Response to Treatment:  tolerated well without complications. · Communication/Consultation:  None today  · Equipment provided today:  None today  · Recommendations/Intent for next treatment session: Next visit will focus on advancement to more challenging activities.     Total Treatment Billable Duration:  35 minutes  OT Patient Time In/Time Out  Time In: 9996  Time Out: [address] [facility] Jose, OTD, OTR/L    Future Appointments   Date Time Provider Aleksander Hansen   6/4/2021 11:00 AM Ave Arrieta MD [facility]

## 2021-05-18 ENCOUNTER — HOSPITAL ENCOUNTER (OUTPATIENT)
Dept: PHYSICAL THERAPY | Age: 61
Discharge: HOME | End: 2021-05-18
Payer: COMMERCIAL

## 2021-05-18 PROCEDURE — 97110 THERAPEUTIC EXERCISES: CPT

## 2021-05-18 NOTE — PROGRESS NOTES
Ethan  : 1960  Primary:   Secondary:  [address]  at [facility]  [address], [address], [place], [facility]  [phone]   [phone]      OUTPATIENT OCCUPATIONAL THERAPY: Daily Treatment Note 2021  Visit Count:  20    ICD-10: Treatment Diagnosis: Pain in left hand (Y65.266)  Precautions/Allergies:   Patient has no allergy information on record. TREATMENT PLAN:  Effective Dates: 2021 TO 2021 (90 days). Frequency/Duration: 2 times a week for 90 Day(s)    Pre-treatment Symptoms/Complaints:  Patient states her hand is doing better. Pain: Initial: Pain Intensity 1: 0  Pain Location 1: Hand  Pain Orientation 1: Left  Post Session:  3-4/10   Medications Last Reviewed:  2021   Updated Objective Findings:   : SF: DIP flexion extension: 30 PIP flexion extension: 70/19;   : SF: DIP flexion extension: 35 PIP flexion extension: 75/20;   : SF: DIP flexion extension: 30 PIP flexion extension: 70/20;   : SF: DIP flexion extension: 15 PIP flexion extension: 69/15; SF proximal phalanx: NT  : RF DIP flexion extension: 40 PIP flexion extension: 80/20 SF: DIP flexion extension: 7 PIP flexion extension: 67/18; MCP: 61; SF proximal phalanx: 6.0 cm : SF: PIP flexion extension: 55/10; MCP: 60; SF proximal phalanx: 6.2 cmn   TREATMENT:   MODALITIES: (0-10 minutes):      *  Cold Pack Therapy in order to provide analgesia and reduce inflammation and edema. MODALITIES: (0-15 minutes): *  Paraffin Therapy in order to provide analgesia and improve tissue extensibility in preparation for therapeutic exercises. Therapeutic Exercise: (  38 minutes):  Exercises per grid below to improve mobility, strength and coordination. Required minimal visual, verbal, manual and tactile cues to promote proper body alignment, promote proper body posture and promote proper body mechanics.   Progressed resistance, range, repetitions and complexity of movement as indicated. Date:  4/29 Date:  5/4 Date:  5/6 Date:  5/18   Activity/Exercise       Fluidotherapy X 15-20 reps hand AROM  X 15-20 reps hand AROM  X 15-20 reps hand AROM   Hook fist  Using blocking device 2 sets 10-15 reps. Using blocking device 2 sets 10-15 reps. Composite flexion extension  1. Yellow ball 1-2 sets 5-10 reps. 2. Putty press holding marker  pinky/ring green theraputty 1-2 sets 10-15 reps. 3. Small finger down to palm with green theraputty \"target\" 2-3 sets 5-10 reps. 4. 1-2 sets 5-10 sets A/AAROM. 1. 1-2 sets 5-10 reps AROM. 2. Pink rubber ball 1-2 sets 10-15 reps. 3.  Putty press holding marker  pinky/ring green theraputty 1-2 sets 10-15 reps. 4. Small finger down to palm with orange theraputty \"target\" 2-3 sets 5-10 reps 1. 1-2 sets 10-15 reps AROM. 2. 1-2 sets 10-15 reps wax \"target. \"  3. Transporting beads from one bowl to another using composite flexion (focusing on ulnar portion of hand). 4. Gripping dowel with velcro pull/push 1-2 sets 10-15 reps. 1. 1-2 sets 10-15 reps AROM. 2. 1-2 sets 10-15 reps orange theraputty \"target. \"   Finger blocking 1. PIP joint using wooden block A: 1-2 sets 5-10 reps. B: 1-2 sets 5-10 reps green theraputty target. C: finger block 1-2 sets 5-10 reps AROM. 1. PIP joint using wooden block A: 1-2 sets 10-15 reps. 2. DIP joint using foam block finger 1-2 sets 10-15 reps AROM. ORL stretch DIP 3-4 sets held 15-20 seconds. DIP 3-4 sets held 30-60 seconds. DIP 3-4 sets held 30-60 seconds. Digi-flex Pinky 1.5 lbs 1-2 sets 5-10 reps. Pinky 1.5 lbs 1-2 sets 10-15 reps. Pinky 1.5 lbs 1-2 sets 10-15 reps. Home program: As above. [employer] Portal  Treatment/Session Summary:  Myra GALE is flexing to about 40° now more as needed for functional composite gripping for ADL/instrumental ADL. Continue OT per plan of care. · Response to Treatment:  tolerated well without complications.   · Communication/Consultation:  None today  · Equipment provided today:  None today  · Recommendations/Intent for next treatment session: Next visit will focus on advancement to more challenging activities.     Total Treatment Billable Duration:  45 minutes  OT Patient Time In/Time Out  Time In: 7757  Time Out: 1430  Silas Sotelo OTR/L    Future Appointments   Date Time Provider Aleksander Hansen   5/18/2021  1:45 PM Cynthia Rizzo OTD, OTR/L [facility]   5/20/2021  2:30 PM Cynthia Rizzo OTD, OTR/L [facility]   5/25/2021  1:45 PM CHARLIE Michele[facility]   9/30/2021  1:30 PM Ahsan Randall MD [facility]

## 2021-05-20 ENCOUNTER — HOSPITAL ENCOUNTER (OUTPATIENT)
Dept: PHYSICAL THERAPY | Age: 61
Discharge: HOME | End: 2021-05-20
Payer: COMMERCIAL

## 2021-05-20 PROCEDURE — 97760 ORTHOTIC MGMT&TRAING 1ST ENC: CPT

## 2021-05-20 PROCEDURE — 97110 THERAPEUTIC EXERCISES: CPT

## 2021-05-20 NOTE — PROGRESS NOTES
Ethan  : 1960  Primary:   Secondary:  [address]  at [facility]  [address], [address], [place], [facility]  [phone]   [phone]      OUTPATIENT OCCUPATIONAL THERAPY: Daily Treatment Note 2021  Visit Count:  21    ICD-10: Treatment Diagnosis: Pain in left hand (H04.008)  Precautions/Allergies:   Patient has no allergy information on record. TREATMENT PLAN:  Effective Dates: 2021 TO 2021 (90 days). Frequency/Duration: 2 times a week for 90 Day(s)    Pre-treatment Symptoms/Complaints:  Patient states she drove here today. Pain: Initial: Pain Intensity 1: 0  Pain Location 1: Hand  Pain Orientation 1: Left  Post Session:  3-4/10   Medications Last Reviewed:  2021   Updated Objective Findings:   : SF: DIP flexion extension: 30 PIP flexion extension: 70/19;   : SF: DIP flexion extension: 35 PIP flexion extension: 75/20;   : SF: DIP flexion extension: 30 PIP flexion extension: 70/20;   : SF: DIP flexion extension: 15 PIP flexion extension: 69/15; SF proximal phalanx: NT  : RF DIP flexion extension: 40 PIP flexion extension: 80/20 SF: DIP flexion extension: 7 PIP flexion extension: 67/18; MCP: 61; SF proximal phalanx: 6.0 cm : SF: PIP flexion extension: 55/10; MCP: 60; SF proximal phalanx: 6.2 cmn   TREATMENT:   MODALITIES: (0 minutes):      *  Cold Pack Therapy in order to provide analgesia and reduce inflammation and edema. MODALITIES: (0 minutes): *  Paraffin Therapy in order to provide analgesia and improve tissue extensibility in preparation for therapeutic exercises. Therapeutic Exercise: (  23 minutes):  Exercises per grid below to improve mobility, strength and coordination. Required minimal visual, verbal, manual and tactile cues to promote proper body alignment, promote proper body posture and promote proper body mechanics.   Progressed resistance, range, repetitions and complexity of movement as indicated. Date:  4/29 Date:  5/4 Date:  5/6 Date:  5/18 Date:  5/20   Activity/Exercise        Fluidotherapy X 15-20 reps hand AROM  X 15-20 reps hand AROM  X 15-20 reps hand AROM X 15-20 reps hand AROM   Hook fist  Using blocking device 2 sets 10-15 reps. Using blocking device 2 sets 10-15 reps. Using blocking device 2 sets 10-15 reps. Composite flexion extension  1. Yellow ball 1-2 sets 5-10 reps. 2. Putty press holding marker  pinky/ring green theraputty 1-2 sets 10-15 reps. 3. Small finger down to palm with green theraputty \"target\" 2-3 sets 5-10 reps. 4. 1-2 sets 5-10 sets A/AAROM. 1. 1-2 sets 5-10 reps AROM. 2. Pink rubber ball 1-2 sets 10-15 reps. 3.  Putty press holding marker  pinky/ring green theraputty 1-2 sets 10-15 reps. 4. Small finger down to palm with orange theraputty \"target\" 2-3 sets 5-10 reps 1. 1-2 sets 10-15 reps AROM. 2. 1-2 sets 10-15 reps wax \"target. \"  3. Transporting beads from one bowl to another using composite flexion (focusing on ulnar portion of hand). 4. Gripping dowel with velcro pull/push 1-2 sets 10-15 reps. 1. 1-2 sets 10-15 reps AROM. 2. 1-2 sets 10-15 reps orange theraputty \"target. \" 1. 1-2 sets 10-15 reps AROM. 2. 1-2 sets 10-15 reps green theraputty \"target. \"   Finger blocking 1. PIP joint using wooden block A: 1-2 sets 5-10 reps. B: 1-2 sets 5-10 reps green theraputty target. C: finger block 1-2 sets 5-10 reps AROM. 1. PIP joint using wooden block A: 1-2 sets 10-15 reps. 2. DIP joint using foam block finger 1-2 sets 10-15 reps AROM. ORL stretch DIP 3-4 sets held 15-20 seconds. DIP 3-4 sets held 30-60 seconds. DIP 3-4 sets held 30-60 seconds. Digi-flex Pinky 1.5 lbs 1-2 sets 5-10 reps. Pinky 1.5 lbs 1-2 sets 10-15 reps. Pinky 1.5 lbs 1-2 sets 10-15 reps. Home program: As above. Orthotic Management/Training: (15 minutes): This hand (pinky)- left orthotic is being utilized in order to increase patient's functional independence. Patient/caregiver educated to proper application and appropriate use of this orthotic and the patient benefits from this orthotic by achieving increased independence with tasks. 2 2-3 cm x 1-2 cm strips of 3/16 perforated thermoplastic cut, heated and applied to dorsal P1 and P3. Once cooled, holes punched on lateral edges and string threaded to allow for increased DIP/PIP flexion. Plan to complete upon following sessions. [employer] Portal  Treatment/Session Summary:  Leonard Fierro DIP is flexing to about 40° now more as needed for functional composite gripping for ADL/instrumental ADL. Continue OT per plan of care. · Response to Treatment:  tolerated well without complications. · Communication/Consultation:  None today  · Equipment provided today:  None today  · Recommendations/Intent for next treatment session: Next visit will focus on advancement to more challenging activities.     Total Treatment Billable Duration:  45 minutes  OT Patient Time In/Time Out  Time In: 1430  Time Out: 1515  Silas Sotelo OTR/L    Future Appointments   Date Time Provider Aleksander Hansen   5/20/2021  2:30 PM Teo Gaytan OTR/L [facility]   5/25/2021  1:45 PM CHARLIE Michele, OTR/L [facility]   6/1/2021  1:45 PM CHARLIE Michele, OTR/L [facility]   9/30/2021  1:30 PM Ahsan Randall MD [facility] FPA
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
## 2021-05-25 ENCOUNTER — HOSPITAL ENCOUNTER (OUTPATIENT)
Dept: PHYSICAL THERAPY | Age: 61
Discharge: HOME | End: 2021-05-25
Payer: COMMERCIAL

## 2021-05-25 PROCEDURE — 97018 PARAFFIN BATH THERAPY: CPT

## 2021-05-25 PROCEDURE — 97763 ORTHC/PROSTC MGMT SBSQ ENC: CPT

## 2021-05-25 PROCEDURE — 97110 THERAPEUTIC EXERCISES: CPT

## 2021-05-25 NOTE — PROGRESS NOTES
Ethan  : 1960  Primary:   Secondary:  [address]  at [facility]  [address], [address], [place], [facility]  [phone]   [phone]      OUTPATIENT OCCUPATIONAL THERAPY: Daily Treatment Note 2021  Visit Count:  22    ICD-10: Treatment Diagnosis: Pain in left hand (R33.456)  Precautions/Allergies:   Patient has no allergy information on record. TREATMENT PLAN:  Effective Dates: 2021 TO 2021 (90 days).  Frequency/Duration: 2 times a week for 90 Day(s)    Pre-treatment Symptoms/Complaints:  Patient states she drove here today. Pain: Initial: Pain Intensity 1: 0  Pain Location 1: Hand  Pain Orientation 1: Left  Post Session:  3-4/10   Medications Last Reviewed:  2021   Updated Objective Findings:   : SF: DIP flexion extension: 30 PIP flexion extension: 70/19;   : SF: DIP flexion extension: 35 PIP flexion extension: 75/20;   : SF: DIP flexion extension: 30 PIP flexion extension: 70/20;   : SF: DIP flexion extension: 15 PIP flexion extension: 69/15; SF proximal phalanx: NT  : RF DIP flexion extension: 40 PIP flexion extension: 80/20 SF: DIP flexion extension: 7 PIP flexion extension: 67/18; MCP: 61; SF proximal phalanx: 6.0 cm : SF: PIP flexion extension: 55/10; MCP: 60; SF proximal phalanx: 6.2 cmn   TREATMENT:   MODALITIES: (0-5 minutes):      *  Cold Pack Therapy in order to provide analgesia and reduce inflammation and edema. MODALITIES: (0-5 minutes): *  Paraffin Therapy in order to provide analgesia and improve tissue extensibility in preparation for therapeutic exercises. Therapeutic Exercise: (  23 minutes):  Exercises per grid below to improve mobility, strength and coordination. Required minimal visual, verbal, manual and tactile cues to promote proper body alignment, promote proper body posture and promote proper body mechanics.   Progressed resistance, range, repetitions and complexity of movement as indicated. Date:  5/6 Date:  5/18 Date:  5/20 Date:  5/25   Activity/Exercise       Fluidotherapy  X 15-20 reps hand AROM X 15-20 reps hand AROM    Hook fist Using blocking device 2 sets 10-15 reps. Using blocking device 2 sets 10-15 reps. 1-2 sets green theraputty 5-10 reps with gentle overpressure. Composite flexion extension  1. 1-2 sets 10-15 reps AROM. 2. 1-2 sets 10-15 reps wax \"target. \"  3. Transporting beads from one bowl to another using composite flexion (focusing on ulnar portion of hand). 4. Gripping dowel with velcro pull/push 1-2 sets 10-15 reps. 1. 1-2 sets 10-15 reps AROM. 2. 1-2 sets 10-15 reps orange theraputty \"target. \" 1. 1-2 sets 10-15 reps AROM. 2. 1-2 sets 10-15 reps green theraputty \"target. \" 1. 1-2 sets hold 2-3 minutes (stretch). 2. 1-2 sets 10-15 reps green theraputty \"target. \"   Finger blocking  1. PIP joint using wooden block A: 1-2 sets 10-15 reps. 2. DIP joint using foam block finger 1-2 sets 10-15 reps AROM. 1. PIP/DIP joint using wooden block A: 1-2 sets 10-15 reps. ORL stretch DIP 3-4 sets held 30-60 seconds. DIP 3-4 sets held 30-60 seconds. Digi-flex  Pinky 1.5 lbs 1-2 sets 10-15 reps. Home program: As above. Orthotic Management/Training Subsequent Session: (15 minutes): This hand (pinky)- left orthotic is being utilized in order to increase patient's functional independence. Patient/caregiver educated to proper application and appropriate use of this orthotic and the patient benefits from this orthotic by achieving increased independence with tasks. 2 2-3 cm x 1-2 cm strips of 3/16 perforated thermoplastic cut, heated and applied to dorsal P1 and P3. Flexible string threaded with a 2 point knot tied with another string to allow for increased DIP/PIP flexion. Patient donned/doffed it independently and verbalized understanding of wearing schedule to enable \"hook . \"    [employer] Portal  Treatment/Session Summary:  Zeeshan GALE is flexing to about 40° post exercises. PIP to 80° as needed for functional composite gripping for ADL/instrumental ADL. Continue OT per plan of care. · Response to Treatment:  tolerated well without complications. · Communication/Consultation:  None today  · Equipment provided today:  None today  · Recommendations/Intent for next treatment session: Next visit will focus on advancement to more challenging activities.     Total Treatment Billable Duration:  45 minutes  OT Patient Time In/Time Out  Time In: 9693  Time Out: 1430  Jennifer Guthrie OTR/L    Future Appointments   Date Time Provider Aleksander Hansen   5/25/2021  1:45 PM Dorys Rachel OTR/L [facility]   5/28/2021  9:30 AM CHAR[facility] Aquino, [facility]   6/1/2021  1:45 PM CHARLIE Aquino, OTR/L [facility]   6/7/2021  2:30 PM CHARLIE Aquino, [facility]   6/9/2021  1:45 PM Octavia Bruce OTD, OTR/L [facility]   9/30/2021  1:30 PM MD KRYSTYNA HerndonA AIA

## 2021-05-28 ENCOUNTER — HOSPITAL ENCOUNTER (OUTPATIENT)
Dept: PHYSICAL THERAPY | Age: 61
Discharge: HOME | End: 2021-05-28
Payer: COMMERCIAL

## 2021-05-28 PROCEDURE — 97110 THERAPEUTIC EXERCISES: CPT

## 2021-05-28 PROCEDURE — 97018 PARAFFIN BATH THERAPY: CPT

## 2021-05-28 NOTE — PROGRESS NOTES
Ethan  : 1960  Primary:   Secondary:  [address]  at [facility]  [address], [address], [place], [facility]  [phone]   [phone]      OUTPATIENT OCCUPATIONAL THERAPY: Daily Treatment Note 2021  Visit Count:  23    ICD-10: Treatment Diagnosis: Pain in left hand (C28.955)  Precautions/Allergies:   Patient has no allergy information on record. TREATMENT PLAN:   Effective Dates:   2021 TO 2021 (90 days).  Frequency/Duration: 2 times a week for 90 Day(s)    Pre-treatment Symptoms/Complaints:  Patient states she drove here today. Pain: Initial: Pain Intensity 1: 0  Pain Location 1: Hand  Pain Orientation 1: Left  Post Session:  3-4/10   Medications Last Reviewed:  2021   Updated Objective Findings:   : SF: DIP flexion extension: 30 PIP flexion extension: 70/19;   : SF: DIP flexion extension: 35 PIP flexion extension: 75/20;   : SF: DIP flexion extension: 30 PIP flexion extension: 70/20;   : SF: DIP flexion extension: 15 PIP flexion extension: 69/15; SF proximal phalanx: NT  : RF DIP flexion extension: 40 PIP flexion extension: 80/20 SF: DIP flexion extension: 7 PIP flexion extension: 67/18; MCP: 61; SF proximal phalanx: 6.0 cm : SF: PIP flexion extension: 55/10; MCP: 60; SF proximal phalanx: 6.2 cmn   TREATMENT:   MODALITIES: (0-5 minutes):      *  Cold Pack Therapy in order to provide analgesia and reduce inflammation and edema. MODALITIES: (0-5 minutes): *  Paraffin Therapy in order to provide analgesia and improve tissue extensibility in preparation for therapeutic exercises. Therapeutic Exercise: (  40 minutes):  Exercises per grid below to improve mobility, strength and coordination. Required minimal visual, verbal, manual and tactile cues to promote proper body alignment, promote proper body posture and promote proper body mechanics.   Progressed resistance, range, repetitions and complexity of movement as indicated. Date:  5/6 Date:  5/18 Date:  5/20 Date:  5/25 Date:  5/28   Activity/Exercise        Fluidotherapy  X 15-20 reps hand AROM X 15-20 reps hand AROM     Tendon glides     1-2 sets 5-10 reps. Hook fist Using blocking device 2 sets 10-15 reps. Using blocking device 2 sets 10-15 reps. 1-2 sets green theraputty 5-10 reps with gentle overpressure. 1. Co-band wrap after paraffin 2 sets held 2-3 minutes. 2. 3-4 sets yellow theraputty 5-10 reps with gentle overpressure. 3. Via dynamic flexion brace 2-3 sets hold 30-60 seconds. Composite flexion extension  1. 1-2 sets 10-15 reps AROM. 2. 1-2 sets 10-15 reps wax \"target. \"  3. Transporting beads from one bowl to another using composite flexion (focusing on ulnar portion of hand). 4. Gripping dowel with velcro pull/push 1-2 sets 10-15 reps. 1. 1-2 sets 10-15 reps AROM. 2. 1-2 sets 10-15 reps orange theraputty \"target. \" 1. 1-2 sets 10-15 reps AROM. 2. 1-2 sets 10-15 reps green theraputty \"target. \" 1. 1-2 sets hold 2-3 minutes (stretch). 2. 1-2 sets 10-15 reps green theraputty \"target. \" 1-2 sets 10-15 reps yellow theraputty \"target. \"   Finger blocking  1. PIP joint using wooden block A: 1-2 sets 10-15 reps. 2. DIP joint using foam block finger 1-2 sets 10-15 reps AROM. 1. PIP/DIP joint using wooden block A: 1-2 sets 10-15 reps. 1. PIP/DIP joint using wooden block A: 3-4 sets 10-15 reps. B: DIP only 2-3 sets 5-10 reps. ORL stretch DIP 3-4 sets held 30-60 seconds. DIP 3-4 sets held 30-60 seconds. Digi-flex  Pinky 1.5 lbs 1-2 sets 10-15 reps. Home program: As above. Orthotic Management/Training Subsequent Session: (5 minutes): This hand (pinky)- left orthotic is being utilized in order to increase patient's functional independence. Patient/caregiver educated to proper application and appropriate use of this orthotic and the patient benefits from this orthotic by achieving increased independence with tasks.   Patient donned/doffed it independently and verbalized understanding of wearing schedule to enable \"hook . \"  She required increased practice to increase IP/DIP flexion. [employer] Portal  Treatment/Session Summary:  Beata Barnhart DIP is flexing to about 40° post exercises. PIP to 77° as needed for functional composite gripping for ADL/instrumental ADL. Continue OT per plan of care. · Response to Treatment:  tolerated well without complications. · Communication/Consultation:  None today  · Equipment provided today:  None today  · Recommendations/Intent for next treatment session: Next visit will focus on advancement to more challenging activities.     Total Treatment Billable Duration:  45 minutes  OT Patient Time In/Time Out  Time In: 0930  Time Out: 1015  Carlos Mancera OTR/L    Future Appointments   Date Time Provider Aleksander Hansen   6/1/2021  1:45 PM CHARLIE Owens, OTR/L [facility] SFE   6/7/2021  2:30 PM Sugar Humphries OTR/L [facility] SFD   6/9/2021  1:45 PM CHARLIE Owens OTR/L [facility]   9/30/2021  1:30 PM MD KRYSTYNA Beth FPA FPA

## 2021-06-01 ENCOUNTER — HOSPITAL ENCOUNTER (OUTPATIENT)
Dept: PHYSICAL THERAPY | Age: 61
Discharge: HOME | End: 2021-06-01
Payer: COMMERCIAL

## 2021-06-01 PROCEDURE — 97110 THERAPEUTIC EXERCISES: CPT

## 2021-06-01 NOTE — PROGRESS NOTES
Sharron Rooney  : 1960  Primary:   Secondary:  [address] at [facility]  [address], [address], [place], [facility]  [phone]   [phone]      OUTPATIENT OCCUPATIONAL THERAPY: Daily Treatment Note 2021  Visit Count:  24    ICD-10: Treatment Diagnosis: Pain in left hand (E36.216)  Precautions/Allergies:   Patient has no allergy information on record. TREATMENT PLAN:  Effective Dates:    2021 TO 2021 (90 days).  Frequency/Duration: 2 times a week for 90 Day(s)  Pre-treatment Symptoms/Complaints:   .  Pain: Initial: Pain Intensity 1: 0  Pain Location 1: Hand  Pain Orientation 1: Left  Post Session:  3-4/10   Medications Last Reviewed:  2021   Updated Objective Findings:   : SF: DIP flexion extension: 35 PIP flexion extension: 65/20;  : SF: DIP flexion extension: 30 PIP flexion extension: 70/19;   : SF: DIP flexion extension: 35 PIP flexion extension: 75/20;   : SF: DIP flexion extension: 30 PIP flexion extension: 70/20;   : SF: DIP flexion extension: 15 PIP flexion extension: 69/15; SF proximal phalanx: NT  : RF DIP flexion extension: 40 PIP flexion extension: 80/20 SF: DIP flexion extension: 7 PIP flexion extension: 67/18; MCP: 61; SF proximal phalanx: 6.0 cm : SF: PIP flexion extension: 55/10; MCP: 60; SF proximal phalanx: 6.2 cmn   TREATMENT:   MODALITIES: (0 minutes):      *  Cold Pack Therapy in order to provide analgesia and reduce inflammation and edema. MODALITIES: (0minutes): *  Paraffin Therapy in order to provide analgesia and improve tissue extensibility in preparation for therapeutic exercises. Therapeutic Exercise: (  40 minutes):  Exercises per grid below to improve mobility, strength and coordination. Required minimal visual, verbal, manual and tactile cues to promote proper body alignment, promote proper body posture and promote proper body mechanics.   Progressed resistance, range, repetitions and complexity of movement as indicated. Date:  5/18 Date:  5/20 Date:  5/25 Date:  5/28 Date:  6/1   Activity/Exercise        Fluidotherapy X 15-20 reps hand AROM X 15-20 reps hand AROM   X 15-20 reps hand AROM   Tendon glides    1-2 sets 5-10 reps. Hook fist  Using blocking device 2 sets 10-15 reps. 1-2 sets green theraputty 5-10 reps with gentle overpressure. 1. Co-band wrap after paraffin 2 sets held 2-3 minutes. 2. 3-4 sets yellow theraputty 5-10 reps with gentle overpressure. 3. Via dynamic flexion brace 2-3 sets hold 30-60 seconds. 1. 3-4 sets 5-10 reps green theraputty with gentle overpressure. Composite flexion extension  1. 1-2 sets 10-15 reps AROM. 2. 1-2 sets 10-15 reps orange theraputty \"target. \" 1. 1-2 sets 10-15 reps AROM. 2. 1-2 sets 10-15 reps green theraputty \"target. \" 1. 1-2 sets hold 2-3 minutes (stretch). 2. 1-2 sets 10-15 reps green theraputty \"target. \" 1-2 sets 10-15 reps yellow theraputty \"target. \" 1-2 sets 10-15 reps green theraputty \"target. \"   Finger blocking 1. PIP joint using wooden block A: 1-2 sets 10-15 reps. 2. DIP joint using foam block finger 1-2 sets 10-15 reps AROM. 1. PIP/DIP joint using wooden block A: 1-2 sets 10-15 reps. 1. PIP/DIP joint using wooden block A: 3-4 sets 10-15 reps. B: DIP only 2-3 sets 5-10 reps. 1. PIP/DIP joint using wooden block A: 3-4 sets 10-15 reps. B: DIP only 2-3 sets 5-10 reps. ORL stretch DIP 3-4 sets held 30-60 seconds. DIP 3-4 sets held 30-60 seconds. Kang gripper     3 yellow rubber bands 2-3 sets 15-20 reps. Home program: As above. Orthotic Management/Training Subsequent Session: (0 minutes): This hand (pinky)- left orthotic is being utilized in order to increase patient's functional independence. Patient/caregiver educated to proper application and appropriate use of this orthotic and the patient benefits from this orthotic by achieving increased independence with tasks.   Patient donned/doffed it independently and verbalized understanding of wearing schedule to enable \"hook . \"  She required increased practice to increase IP/DIP flexion. [employer] Portal  Treatment/Session Summary:  Evon Knight's DIP is flexing to about 35° post exercises. PIP to 75° as needed for functional composite gripping for ADL/instrumental ADL. Continue OT per plan of care. · Response to Treatment:  tolerated well without complications. · Communication/Consultation:  None today  · Equipment provided today:  None today  · Recommendations/Intent for next treatment session: Next visit will focus on advancement to more challenging activities.     Total Treatment Billable Duration:  45 minutes  OT Patient Time In/Time Out  Time In: 1430  Time Out: 1515  Donny Donato OTR/L    Future Appointments   Date Time Provider Aleksander Hansen   6/1/2021  1:45 PM CHARLIE Hamilton, OTR/L [facility] SFE   6/7/2021  2:30 PM KAVITA Alarcon/L [facility] SFD   6/9/2021  1:45 PM CHARLIE Hamilton OTR/L [facility]   9/30/2021  1:30 PM Shanna Hearn MD [facility] FPA FPA

## 2021-06-07 ENCOUNTER — HOSPITAL ENCOUNTER (OUTPATIENT)
Dept: PHYSICAL THERAPY | Age: 61
Discharge: HOME | End: 2021-06-07
Payer: COMMERCIAL

## 2021-06-07 PROCEDURE — 97018 PARAFFIN BATH THERAPY: CPT

## 2021-06-07 PROCEDURE — 97760 ORTHOTIC MGMT&TRAING 1ST ENC: CPT

## 2021-06-07 PROCEDURE — 97110 THERAPEUTIC EXERCISES: CPT

## 2021-06-07 NOTE — PROGRESS NOTES
Rhianna Broderick  : 1960  Primary:   Secondary:  [address]  at [facility]  [address], [address], [place], [facility]  [phone]   [phone]      OUTPATIENT OCCUPATIONAL THERAPY: Daily Treatment Note 2021  Visit Count:  25    ICD-10: Treatment Diagnosis: Pain in left hand (S99.166)  Precautions/Allergies:   Patient has no allergy information on record. TREATMENT PLAN:  Effective Dates: 2021 TO 2021 (90 days). Frequency/Duration: 2 times a week for 90 Day(s)    Pre-treatment Symptoms/Complaints:  Patient states she drove here today. Pain: Initial: Pain Intensity 1: 0  Pain Location 1: Hand  Pain Orientation 1: Left  Post Session:  3-4/10   Medications Last Reviewed:  2021   Updated Objective Findings:   : AROM  SF: DIP flexion extension: 35 PIP flexion extension: 80/20; PROM: SF: DIP flexion extension: 40 PIP flexion extension: 92/20  : SF: DIP flexion extension: 35 PIP flexion extension: 65/20;  : SF: DIP flexion extension: 30 PIP flexion extension: 70/19;   : SF: DIP flexion extension: 35 PIP flexion extension: 75/20;   : SF: DIP flexion extension: 30 PIP flexion extension: 70/20;   : SF: DIP flexion extension: 15 PIP flexion extension: 69/15; SF proximal phalanx: NT  : RF DIP flexion extension: 40 PIP flexion extension: 80/20 SF: DIP flexion extension: 7 PIP flexion extension: 67/18; MCP: 61; SF proximal phalanx: 6.0 cm : SF: PIP flexion extension: 55/10; MCP: 60; SF proximal phalanx: 6.2 cmn   TREATMENT:   MODALITIES: (0 minutes):      *  Cold Pack Therapy in order to provide analgesia and reduce inflammation and edema. MODALITIES: (5 minutes): *  Paraffin Therapy in order to provide analgesia and improve tissue extensibility in preparation for therapeutic exercises. Therapeutic Exercise: (  33 minutes):  Exercises per grid below to improve mobility, strength and coordination.   Required minimal visual, verbal, manual and tactile cues to promote proper body alignment, promote proper body posture and promote proper body mechanics. Progressed resistance, range, repetitions and complexity of movement as indicated. Date:  5/18 Date:  5/20 Date:  5/25 Date:  5/28 Date:  6/1 Date:  6/7   Activity/Exercise         Fluidotherapy X 15-20 reps hand AROM X 15-20 reps hand AROM   X 15-20 reps hand AROM X 15-20 reps hand AROM   Tendon glides    1-2 sets 5-10 reps. Hook fist  Using blocking device 2 sets 10-15 reps. 1-2 sets green theraputty 5-10 reps with gentle overpressure. 1. Co-band wrap after paraffin 2 sets held 2-3 minutes. 2. 3-4 sets yellow theraputty 5-10 reps with gentle overpressure. 3. Via dynamic flexion brace 2-3 sets hold 30-60 seconds. 1. 3-4 sets 5-10 reps green theraputty with gentle overpressure. 1. Co-band wrap after paraffin 2 sets held 2-3 minutes. 2. 2-3sets green theraputty 5-10 reps with gentle overpressure. Composite flexion extension  1. 1-2 sets 10-15 reps AROM. 2. 1-2 sets 10-15 reps orange theraputty \"target. \" 1. 1-2 sets 10-15 reps AROM. 2. 1-2 sets 10-15 reps green theraputty \"target. \" 1. 1-2 sets hold 2-3 minutes (stretch). 2. 1-2 sets 10-15 reps green theraputty \"target. \" 1-2 sets 10-15 reps yellow theraputty \"target. \" 1-2 sets 10-15 reps green theraputty \"target. \" 1-2 sets 10-15 reps green theraputty \"target. \"   Finger blocking 1. PIP joint using wooden block A: 1-2 sets 10-15 reps. 2. DIP joint using foam block finger 1-2 sets 10-15 reps AROM. 1. PIP/DIP joint using wooden block A: 1-2 sets 10-15 reps. 1. PIP/DIP joint using wooden block A: 3-4 sets 10-15 reps. B: DIP only 2-3 sets 5-10 reps. 1. PIP/DIP joint using wooden block A: 3-4 sets 10-15 reps. B: DIP only 2-3 sets 5-10 reps. 1. PIP/DIP joint A: 3-4 sets 10-15 reps. B: DIP only 2-3 sets 5-10 reps   ORL stretch DIP 3-4 sets held 30-60 seconds. DIP 3-4 sets held 30-60 seconds.     Hadn gripper     3 yellow rubber bands 2-3 sets 15-20 reps. Home program: As above. Orthotic Management/Training: (8 minutes): This hand (pinky)- left orthotic is being utilized in order to increase patient's functional independence. Patient/caregiver educated to proper application and appropriate use of this orthotic and the patient benefits from this orthotic by achieving increased independence with tasks. An exercise orthosis utilizing orficast more was cut from 6 cm x 30 cm strip and wrapped around patient's metacarpals to the thenar webspace. After cooling, orthosis was spot heated and flared down to enable PIP/DIP flexion with MCP extended. Patient donned/doffed it independently and verbalized understanding of wearing schedule to enable \"hook . \"  She required increased practice to increase PIP/DIP flexion. [employer] Portal  Treatment/Session Summary:  Td Todd DIP is flexing to about 30° post exercises. PIP to 82° as needed for functional composite gripping for ADL/instrumental ADL. She reports she is practicing functional grasp/release and not avoiding gripping with pinky. Continue OT per plan of care. · Response to Treatment:  tolerated well without complications. · Communication/Consultation:  None today  · Equipment provided today:  None today  · Recommendations/Intent for next treatment session: Next visit will focus on advancement to more challenging activities.     Total Treatment Billable Duration:  45 minutes  OT Patient Time In/Time Out  Time In: 1430  Time Out: 1515  Gabo Trejo OTR/L    Future Appointments   Date Time Provider Aleksander Hansen   6/7/2021  2:30 PM Rayma Reason, OTR/L [facility]   6/9/2021  1:45 PM Rayma Reason, OTR/L [facility]   9/30/2021  1:30 PM Suresh Figueredo MD [facility] FPA FPA

## 2021-06-09 ENCOUNTER — HOSPITAL ENCOUNTER (OUTPATIENT)
Dept: PHYSICAL THERAPY | Age: 61
Discharge: HOME | End: 2021-06-09
Payer: COMMERCIAL

## 2021-06-09 PROCEDURE — 97018 PARAFFIN BATH THERAPY: CPT

## 2021-06-09 PROCEDURE — 97110 THERAPEUTIC EXERCISES: CPT

## 2021-06-09 NOTE — PROGRESS NOTES
Evon Gamboa  : 1960  Primary:   Secondary:  [address] at [facility]  [address], [address], [place], [address] [place] [address]  [phone]   [phone]      OUTPATIENT OCCUPATIONAL THERAPY: Daily Treatment Note 2021  Visit Count:  26    ICD-10: Treatment Diagnosis: Pain in left hand (D33.733)  Precautions/Allergies:   Patient has no allergy information on record. TREATMENT PLAN:  Effective Dates: 2021 TO 2021 (90 days). Frequency/Duration: 2 times a week for 90 Day(s)    Pre-treatment Symptoms/Complaints:  Patient states she has been wearing the brace. Still has some swelling in her finger. She tends to \"drop\" her pills. Pain: Initial: Pain Intensity 1: 0  Pain Location 1: Hand  Pain Orientation 1: Left  Post Session:  3-4/10   Medications Last Reviewed:  2021   Updated Objective Findings:   : AROM  SF: DIP flexion extension: 35 PIP flexion extension: 80/20; PROM: SF: DIP flexion extension: 40 PIP flexion extension: 92/20  : SF: DIP flexion extension: 35 PIP flexion extension: 65/20;  : SF: DIP flexion extension: 30 PIP flexion extension: 70/19;   : SF: DIP flexion extension: 35 PIP flexion extension: 75/20;   : SF: DIP flexion extension: 30 PIP flexion extension: 70/20;   : SF: DIP flexion extension: 15 PIP flexion extension: 69/15; SF proximal phalanx: NT  : RF DIP flexion extension: 40 PIP flexion extension: 80/20 SF: DIP flexion extension: 7 PIP flexion extension: 67/18; MCP: 61; SF proximal phalanx: 6.0 cm : SF: PIP flexion extension: 55/10; MCP: 60; SF proximal phalanx: 6.2 cmn   TREATMENT:   MODALITIES: (0 minutes):      *  Cold Pack Therapy in order to provide analgesia and reduce inflammation and edema. MODALITIES: (5 minutes): *  Paraffin Therapy in order to provide analgesia and improve tissue extensibility in preparation for therapeutic exercises.    Therapeutic Exercise: (  53 minutes):  Exercises per grid below to improve mobility, strength and coordination. Required minimal visual, verbal, manual and tactile cues to promote proper body alignment, promote proper body posture and promote proper body mechanics. Progressed resistance, range, repetitions and complexity of movement as indicated. Date:  6/1 Date:  6/7 Date:  6/9   Activity/Exercise      Fluidotherapy X 15-20 reps hand AROM X 15-20 reps hand AROM X 15-20 reps hand AROM   Tendon glides   2-3 sets 5-10 reps. Hook fist 1. 3-4 sets 5-10 reps green theraputty with gentle overpressure. 1. Co-band wrap after paraffin 2 sets held 2-3 minutes. 2. 2-3sets green theraputty 5-10 reps with gentle overpressure. 1. Co-band wrap after paraffin 2 sets held 2-3 minutes. 2. 2-3sets green theraputty 5-10 reps with gentle overpressure. 3. Using Intrinsic minus orthosis 1-2 sets 10-15 reps. Composite flexion extension  1-2 sets 10-15 reps green theraputty \"target. \" 1-2 sets 10-15 reps green theraputty \"target. \" 1. 1-2 sets 10-15 reps green theraputty \"target. \"  2. Holding coin with pinky while retrieving another with index and thumb 1-2 sets 5-10 reps. Finger blocking 1. PIP/DIP joint using wooden block A: 3-4 sets 10-15 reps. B: DIP only 2-3 sets 5-10 reps. 1. PIP/DIP joint A: 3-4 sets 10-15 reps. B: DIP only 2-3 sets 5-10 reps 1. PIP/DIP joint A: 1-2 sets 10-15 reps using blocking orthosis. ORL stretch DIP 3-4 sets held 30-60 seconds. Hand gripper 3 yellow rubber bands 2-3 sets 15-20 reps. Digi-flex    1.5 lbs 1-2 sets 10-15 reps. Home program: As above. Orthotic Management/Training: (2 minutes): This hand (pinky)- left orthotic is being utilized in order to increase patient's functional independence. Patient/caregiver educated to proper application and appropriate use of this orthotic and the patient benefits from this orthotic by achieving increased independence with tasks.   An exercise orthosis utilizing orficast more was cut from 6 cm x 30 cm strip and wrapped around patient's metacarpals to the thenar webspace. After cooling, orthosis was spot heated and flared down to enable PIP/DIP flexion with MCP extended. Patient donned/doffed it independently and verbalized understanding of wearing schedule to enable \"hook . \"  She required increased practice to increase PIP/DIP flexion. [facility] Portal  Treatment/Session Summary:  Charlie Avelar DIP is flexing to about 37° and  PIP to 85° post exercises as needed for functional composite gripping for ADL/instrumental ADL. She reports she is practicing functional grasp/release and not avoiding gripping with pinky. Continue OT per plan of care. · Response to Treatment:  tolerated well without complications. · Communication/Consultation:  None today  · Equipment provided today:  None today  · Recommendations/Intent for next treatment session: Next visit will focus on advancement to more challenging activities.     Total Treatment Billable Duration:  60 minutes  OT Patient Time In/Time Out  Time In: 7842  Time Out: 1445  Mili Casas OTR/L    Future Appointments   Date Time Provider Aleksander Hansen   6/14/2021  3:15 PM [facility]   9/30/2021  1:30 PM Jarett Toledo MD [facility] FPA FPA

## 2021-06-14 ENCOUNTER — HOSPITAL ENCOUNTER (OUTPATIENT)
Dept: PHYSICAL THERAPY | Age: 61
Discharge: HOME | End: 2021-06-14
Payer: COMMERCIAL

## 2021-06-14 PROCEDURE — 97110 THERAPEUTIC EXERCISES: CPT

## 2021-06-14 NOTE — PROGRESS NOTES
Ethan  : 1960  Primary:   Secondary:  [address] [place]  at [facility]  [address], [address], [place], [facility]  [phone]   [phone]      OUTPATIENT OCCUPATIONAL THERAPY: Daily Treatment Note 2021  Visit Count:  27    ICD-10: Treatment Diagnosis: Pain in left hand (D08.814)  Precautions/Allergies:   Patient has no allergy information on record. TREATMENT PLAN:  Effective Dates: 2021 TO 2021 (90 days). Frequency/Duration: 2 times a week for 90 Day(s)    Pre-treatment Symptoms/Complaints:  Patient states her hand is doing well. Pain: Initial: Pain Intensity 1: 0  Pain Location 1: Hand  Pain Orientation 1: Left  Post Session:  No complaints/10   Medications Last Reviewed:  2021   Updated Objective Findings:   : AROM  SF: DIP flexion extension: 35 PIP flexion extension: 85/20.  : AROM  SF: DIP flexion extension: 35 PIP flexion extension: 80/20; PROM: SF: DIP flexion extension: 40 PIP flexion extension: 92/20  : SF: DIP flexion extension: 35 PIP flexion extension: 65/20;  : SF: DIP flexion extension: 30 PIP flexion extension: 70/19;   : SF: DIP flexion extension: 35 PIP flexion extension: 75/20;   : SF: DIP flexion extension: 30 PIP flexion extension: 70/20;   : SF: DIP flexion extension: 15 PIP flexion extension: 69/15; SF proximal phalanx: NT  : RF DIP flexion extension: 40 PIP flexion extension: 80/20 SF: DIP flexion extension: 7 PIP flexion extension: 67/18; MCP: 61; SF proximal phalanx: 6.0 cm : SF: PIP flexion extension: 55/10; MCP: 60; SF proximal phalanx: 6.2 cmn   TREATMENT:   MODALITIES: (0-5 minutes):      *  Cold Pack Therapy in order to provide analgesia and reduce inflammation and edema. Therapeutic Exercise: (  38 minutes):  Exercises per grid below to improve mobility, strength and coordination.   Required minimal visual, verbal, manual and tactile cues to promote proper body alignment, promote proper body posture and promote proper body mechanics. Progressed resistance, range, repetitions and complexity of movement as indicated. Date:  6/9 Date:  6/14   Activity/Exercise     Fluidotherapy X 15-20 reps hand AROM X 15-20 reps hand AROM   Tendon glides 2-3 sets 5-10 reps. 2-3 sets 5-10 reps. Hook fist 1. Co-band wrap after paraffin 2 sets held 2-3 minutes. 2. 2-3sets green theraputty 5-10 reps with gentle overpressure. 3. Using Intrinsic minus orthosis 1-2 sets 10-15 reps. 1. 2-3sets yellow theraputty 5-10 reps with gentle overpressure. 2.  Using intrinsic minus orthosis 1-2 sets 10-15 reps. Composite flexion extension  1. 1-2 sets 10-15 reps green theraputty \"target. \"  2. Holding coin with pinky while retrieving another with index and thumb 1-2 sets 5-10 reps. 1. 1-2 sets 10-15 reps yellow theraputty \"target. \"  2. Holding coin with pinky while retrieving another with index and thumb 1-2 sets 5-10 reps   Finger blocking 1. PIP/DIP joint A: 1-2 sets 10-15 reps using blocking orthosis. 1. DIP joint A: 1-2 sets 10-15 reps . ORL stretch  DIP 2-3 sets held  seconds. Hand gripper     Digi-flex  1.5 lbs 1-2 sets 10-15 reps. 1.5 lbs 1-2 sets 10-15 reps. Home program: As above. Manual Therapy: (3 minutes): Soft tissue mobilization was utilized and necessary because of the patient's restricted joint motion and restricted motion of soft tissue. Perpendicular gentle soft tissue massage completed along lateral DIP/PIP joints and dorsal DIP joint with cocoa butter. [facility]  Treatment/Session Summary:  Td Todd DIP is flexing to about 35-40° and  PIP to 85° post exercises as needed for functional composite gripping for ADL/instrumental ADL. We reinforced practicing functional grasp/release and not avoiding gripping with pinky. Patient demonstrated/verbalized understanding. Continue OT per plan of care.    · Response to Treatment:  tolerated well without complications. · Communication/Consultation:  None today  · Equipment provided today:  None today  · Recommendations/Intent for next treatment session: Next visit will focus on advancement to more challenging activities.     Total Treatment Billable Duration:  45 minutes  OT Patient Time In/Time Out  Time In: 2901  Time Out: 1600  Jennifer Guthrie OTR/L    Future Appointments   Date Time Provider Aleksander Hansen   6/14/2021  3:15 PM Dorys Rachel OTR/L [facility]   9/30/2021  1:30 PM Joel Toledo MD [facility] FPA FPA

## 2021-07-02 ENCOUNTER — HOSPITAL ENCOUNTER (OUTPATIENT)
Dept: PHYSICAL THERAPY | Age: 61
Discharge: HOME | End: 2021-07-02
Payer: COMMERCIAL

## 2021-07-02 PROCEDURE — 97018 PARAFFIN BATH THERAPY: CPT

## 2021-07-02 PROCEDURE — 97110 THERAPEUTIC EXERCISES: CPT
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
## 2021-07-02 NOTE — PROGRESS NOTES
Ethan  : 1960  Primary:   Secondary:  [address]  at [facility]  [address], [address], [place], [facility]  [phone]   [phone]      OUTPATIENT OCCUPATIONAL THERAPY: Daily Treatment Note 2021  Visit Count:  28    ICD-10: Treatment Diagnosis: Pain in left hand (A88.328)  Precautions/Allergies:   Patient has no allergy information on record. TREATMENT PLAN:  Effective Dates: 2021 TO 2021 (90 days). Frequency/Duration: 2 times a week for 90 Day(s)    Pre-treatment Symptoms/Complaints:  Patient states her sister sent her a paraffin machine and she has been using it. She states holding pills/coins is still difficult. Pleased that she can bend it further than what she was told. Pain: Initial: Pain Intensity 1: 0  Pain Location 1: Hand  Pain Orientation 1: Left  Post Session:  No complaints/10   Medications Last Reviewed:  2021   Updated Objective Findings: : see d/c summary. : AROM  SF: DIP flexion extension: 35 PIP flexion extension: 85/20.  : SF: DIP flexion extension: 35 PIP flexion extension: 75/20;   : SF: DIP flexion extension: 30 PIP flexion extension: 70/20;   : RF DIP flexion extension: 40 PIP flexion extension: 80/20 SF: DIP flexion extension: 7 PIP flexion extension: 67/18; MCP: 61; SF proximal phalanx: 6.0 cm : SF: PIP flexion extension: 55/10; MCP: 60; SF proximal phalanx: 6.2 cmn   TREATMENT:   MODALITIES: (0-10 minutes): *  Paraffin Therapy in order to provide analgesia and improve tissue extensibility in preparation for therapeutic exercises. Therapeutic Exercise: (  38 minutes):  Exercises per grid below to improve mobility, strength and coordination. Required minimal visual, verbal, manual and tactile cues to promote proper body alignment, promote proper body posture and promote proper body mechanics.   Progressed resistance, range, repetitions and complexity of movement as indicated. Date:  6/14 Date:  7/2   Activity/Exercise     Fluidotherapy X 15-20 reps hand AROM    Tendon glides 2-3 sets 5-10 reps. 2-3 sets 5-10 reps. Hook fist 1. 2-3sets yellow theraputty 5-10 reps with gentle overpressure. 2.  Using intrinsic minus orthosis 1-2 sets 10-15 reps. 1. Co-band wrap after paraffin 2 sets held 2-3 minutes. 2. 2-3 sets 5-10 reps with  Overpressure as needed. 2. Pushing at DIP into intrinsic minus orthosis 1-2 sets 10-15 reps. 3. 2-3 sets 5-10 reps AROM. Composite flexion extension  1. 1-2 sets 10-15 reps yellow theraputty \"target. \"  2. Holding coin with pinky while retrieving another with index and thumb 1-2 sets 5-10 reps 1. 1-2 sets 10-15 reps yellow theraputty \"target. \"  2. Holding coin with pinky while retrieving another with index and thumb 1-2 sets 5-10 reps   Finger blocking 1. DIP joint A: 1-2 sets 10-15 reps . ORL stretch DIP 2-3 sets held  seconds. Digi-flex  1.5 lbs 1-2 sets 10-15 reps. 1.5 lbs 1-2 sets 10-15 reps. Home program: As above. Manual Therapy: (3 minutes): Soft tissue mobilization was utilized and necessary because of the patient's restricted joint motion and restricted motion of soft tissue. Perpendicular gentle soft tissue massage completed along lateral DIP/PIP joints and dorsal DIP joint with cocoa butter. [facility]  Treatment/Session Summary:  Gilford Maltos is able to touch her palm and she is satisfied with her progress in therapy. We reinforced intrinsic minus to stretch her intrinsics and flex her DIP. Patient demonstrated/verbalized understanding. Patient is ready for d/c at this time with her home program.    · Response to Treatment:  tolerated well without complications. · Communication/Consultation:  None today  · Equipment provided today:  None today  · Recommendations/Intent for next treatment session: Next visit will focus on advancement to more challenging activities.     Total Treatment Billable Duration:  45 minutes  OT Patient Time In/Time Out  Time In: 1340  Time Out: [place], OTD, OTR/L    Future Appointments   Date Time Provider Aleksander Hansen   9/30/2021  1:30 PM Gala Bailey MD [facility] FPA FPA

## 2021-10-29 ENCOUNTER — HOSPITAL ENCOUNTER (OUTPATIENT)
Dept: MRI IMAGING | Age: 61
Discharge: HOME | End: 2021-10-29
Attending: FAMILY MEDICINE
Payer: COMMERCIAL

## 2021-10-29 DIAGNOSIS — G45.9 TIA (TRANSIENT ISCHEMIC ATTACK): ICD-10-CM

## 2021-10-29 PROCEDURE — 70553 MRI BRAIN STEM W/O & W/DYE: CPT

## 2021-10-29 PROCEDURE — A9576 INJ PROHANCE MULTIPACK: HCPCS | Performed by: FAMILY MEDICINE

## 2021-10-29 PROCEDURE — 74011250636 HC RX REV CODE- 250/636: Performed by: FAMILY MEDICINE

## 2021-10-29 RX ORDER — SODIUM CHLORIDE 0.9 % (FLUSH) 0.9 %
10 SYRINGE (ML) INJECTION
Status: COMPLETED | OUTPATIENT
Start: 2021-10-29 | End: 2021-10-29

## 2021-10-29 RX ADMIN — GADOTERIDOL 20 ML: 279.3 INJECTION, SOLUTION INTRAVENOUS at 12:47

## 2021-10-29 RX ADMIN — Medication 10 ML: at 12:47

## 2022-01-20 PROBLEM — F32.A ANXIETY AND DEPRESSION: Status: ACTIVE | Noted: 2022-01-20

## 2022-01-20 PROBLEM — F41.1 GAD (GENERALIZED ANXIETY DISORDER): Status: ACTIVE | Noted: 2022-01-20

## 2022-01-20 PROBLEM — Z91.419 HISTORY OF ABUSE IN ADULTHOOD: Status: ACTIVE | Noted: 2022-01-20

## 2022-01-20 PROBLEM — F41.9 ANXIETY AND DEPRESSION: Status: ACTIVE | Noted: 2022-01-20

## 2022-01-20 PROBLEM — R63.5 WEIGHT GAIN: Status: ACTIVE | Noted: 2022-01-20

## 2022-01-20 PROBLEM — Z63.0 STRESS DUE TO MARITAL PROBLEMS: Status: ACTIVE | Noted: 2022-01-20

## 2022-01-20 PROBLEM — Z23 ENCOUNTER FOR IMMUNIZATION: Status: ACTIVE | Noted: 2022-01-20

## 2022-02-11 ENCOUNTER — TRANSCRIBE ORDER (OUTPATIENT)
Dept: SCHEDULING | Age: 62
End: 2022-02-11

## 2022-02-11 DIAGNOSIS — Z12.31 VISIT FOR SCREENING MAMMOGRAM: Primary | ICD-10-CM

## 2022-02-14 ENCOUNTER — HOSPITAL ENCOUNTER (OUTPATIENT)
Dept: MAMMOGRAPHY | Age: 62
Discharge: HOME | End: 2022-02-14
Attending: FAMILY MEDICINE
Payer: COMMERCIAL

## 2022-02-14 DIAGNOSIS — Z12.31 VISIT FOR SCREENING MAMMOGRAM: ICD-10-CM

## 2022-02-14 PROCEDURE — 77063 BREAST TOMOSYNTHESIS BI: CPT

## 2022-02-18 ENCOUNTER — TELEPHONE (OUTPATIENT)
Dept: NUTRITION | Age: 62
End: 2022-02-18

## 2022-02-18 NOTE — TELEPHONE ENCOUNTER
Nutrition Counseling: Contacted pt regarding referral. See notes documented in Nutrition Counseling Referral for details. No further follow-up contact from pt. Will close referral for this office.     [address] [facility]  [phone]

## 2022-02-19 PROBLEM — Z23 ENCOUNTER FOR IMMUNIZATION: Status: RESOLVED | Noted: 2022-01-20 | Resolved: 2022-02-19

## 2022-03-05 NOTE — PROGRESS NOTES
Incomplete assessment breast mammogram, needs additional assessment. Pt will be notified by radiology.

## 2022-03-14 ENCOUNTER — HOSPITAL ENCOUNTER (OUTPATIENT)
Dept: MAMMOGRAPHY | Age: 62
Discharge: HOME | End: 2022-03-14
Attending: FAMILY MEDICINE
Payer: COMMERCIAL

## 2022-03-14 DIAGNOSIS — R92.8 ABNORMAL SCREENING MAMMOGRAM: ICD-10-CM

## 2022-03-14 PROCEDURE — 76642 ULTRASOUND BREAST LIMITED: CPT

## 2022-03-19 PROBLEM — F41.9 ANXIETY AND DEPRESSION: Status: ACTIVE | Noted: 2022-01-20

## 2022-03-19 PROBLEM — Z63.0 STRESS DUE TO MARITAL PROBLEMS: Status: ACTIVE | Noted: 2022-01-20

## 2022-03-19 PROBLEM — S62.627B OPEN DISPLACED FRACTURE OF MIDDLE PHALANX OF LEFT LITTLE FINGER: Status: ACTIVE | Noted: 2021-03-26

## 2022-03-19 PROBLEM — F41.1 GAD (GENERALIZED ANXIETY DISORDER): Status: ACTIVE | Noted: 2022-01-20

## 2022-03-19 PROBLEM — R63.5 WEIGHT GAIN: Status: ACTIVE | Noted: 2022-01-20

## 2022-03-19 PROBLEM — Z91.419 HISTORY OF ABUSE IN ADULTHOOD: Status: ACTIVE | Noted: 2022-01-20

## 2022-03-19 PROBLEM — F32.A ANXIETY AND DEPRESSION: Status: ACTIVE | Noted: 2022-01-20

## 2022-04-20 PROBLEM — E66.01 MORBID OBESITY WITH BMI OF 45.0-49.9, ADULT (HCC): Status: ACTIVE | Noted: 2022-04-20

## 2022-05-20 DIAGNOSIS — R07.9 CHEST PAIN, UNSPECIFIED TYPE: Primary | ICD-10-CM

## 2022-05-24 ENCOUNTER — TELEPHONE (OUTPATIENT)
Dept: PRIMARY CARE CLINIC | Facility: CLINIC | Age: 62
End: 2022-05-24

## 2022-05-24 NOTE — TELEPHONE ENCOUNTER
----- Message from Pat Hollis sent at 5/24/2022  3:57 PM EDT -----  Subject: Message to Provider    QUESTIONS  Information for Provider? pt took the last of her levoFLOXacin (LEVAQUIN)   500 mg tablet today for her bronchitis however she is still feeling bad   and would like to know if she should make another appointment or if   something else could be called in.   ---------------------------------------------------------------------------  --------------  [address]  What is the best way for the office to contact you? OK to leave message on   voicemail  Preferred Call Back Phone Number? [phone]  ---------------------------------------------------------------------------  --------------  SCRIPT ANSWERS  Relationship to Patient?  Self

## 2022-05-25 ENCOUNTER — OFFICE VISIT (OUTPATIENT)
Dept: PRIMARY CARE CLINIC | Facility: CLINIC | Age: 62
End: 2022-05-25
Payer: COMMERCIAL

## 2022-05-25 VITALS
HEIGHT: 60 IN | DIASTOLIC BLOOD PRESSURE: 70 MMHG | RESPIRATION RATE: 22 BRPM | BODY MASS INDEX: 47.12 KG/M2 | WEIGHT: 240 LBS | TEMPERATURE: 97.1 F | OXYGEN SATURATION: 94 % | HEART RATE: 82 BPM | SYSTOLIC BLOOD PRESSURE: 111 MMHG

## 2022-05-25 DIAGNOSIS — I10 PRIMARY HYPERTENSION: ICD-10-CM

## 2022-05-25 DIAGNOSIS — J45.41 MODERATE PERSISTENT ASTHMA WITH ACUTE EXACERBATION: Primary | ICD-10-CM

## 2022-05-25 PROCEDURE — 99213 OFFICE O/P EST LOW 20 MIN: CPT | Performed by: FAMILY MEDICINE

## 2022-05-25 RX ORDER — AZITHROMYCIN 500 MG/1
500 TABLET, FILM COATED ORAL DAILY
Qty: 6 TABLET | Refills: 0 | Status: SHIPPED | OUTPATIENT
Start: 2022-05-25 | End: 2022-05-28

## 2022-05-25 RX ORDER — HYDROCODONE POLISTIREX AND CHLORPHENIRAMINE POLISTIREX 10; 8 MG/5ML; MG/5ML
5 SUSPENSION, EXTENDED RELEASE ORAL EVERY 12 HOURS PRN
Qty: 100 ML | Refills: 0 | Status: SHIPPED | OUTPATIENT
Start: 2022-05-25 | End: 2022-06-01

## 2022-05-25 ASSESSMENT — PATIENT HEALTH QUESTIONNAIRE - PHQ9
4. FEELING TIRED OR HAVING LITTLE ENERGY: 0
6. FEELING BAD ABOUT YOURSELF - OR THAT YOU ARE A FAILURE OR HAVE LET YOURSELF OR YOUR FAMILY DOWN: 0
3. TROUBLE FALLING OR STAYING ASLEEP: 0
SUM OF ALL RESPONSES TO PHQ QUESTIONS 1-9: 0
7. TROUBLE CONCENTRATING ON THINGS, SUCH AS READING THE NEWSPAPER OR WATCHING TELEVISION: 0
SUM OF ALL RESPONSES TO PHQ QUESTIONS 1-9: 0
5. POOR APPETITE OR OVEREATING: 0
SUM OF ALL RESPONSES TO PHQ QUESTIONS 1-9: 0
2. FEELING DOWN, DEPRESSED OR HOPELESS: 0
9. THOUGHTS THAT YOU WOULD BE BETTER OFF DEAD, OR OF HURTING YOURSELF: 0
8. MOVING OR SPEAKING SO SLOWLY THAT OTHER PEOPLE COULD HAVE NOTICED. OR THE OPPOSITE, BEING SO FIGETY OR RESTLESS THAT YOU HAVE BEEN MOVING AROUND A LOT MORE THAN USUAL: 0
SUM OF ALL RESPONSES TO PHQ QUESTIONS 1-9: 0
10. IF YOU CHECKED OFF ANY PROBLEMS, HOW DIFFICULT HAVE THESE PROBLEMS MADE IT FOR YOU TO DO YOUR WORK, TAKE CARE OF THINGS AT HOME, OR GET ALONG WITH OTHER PEOPLE: 0
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
## 2022-05-26 ENCOUNTER — TELEPHONE (OUTPATIENT)
Dept: PRIMARY CARE CLINIC | Facility: CLINIC | Age: 62
End: 2022-05-26

## 2022-05-26 NOTE — TELEPHONE ENCOUNTER
Patient confused about zithromax directions. 6 pills were sent but directions say, \"take 1 tablet by mouth daily for 3 days\" please advise.  Patient took one pill yesterday and one today

## 2022-06-09 ENCOUNTER — TELEPHONE (OUTPATIENT)
Dept: PRIMARY CARE CLINIC | Facility: CLINIC | Age: 62
End: 2022-06-09

## 2022-06-09 NOTE — TELEPHONE ENCOUNTER
Patient called and stated that she needs approval for her referrals to the cardiologist and the pulmonologist.  Please call patient at [phone].

## 2022-06-13 ENCOUNTER — OFFICE VISIT (OUTPATIENT)
Dept: PRIMARY CARE CLINIC | Facility: CLINIC | Age: 62
End: 2022-06-13
Payer: COMMERCIAL

## 2022-06-13 VITALS
TEMPERATURE: 97.5 F | DIASTOLIC BLOOD PRESSURE: 81 MMHG | OXYGEN SATURATION: 93 % | WEIGHT: 240 LBS | SYSTOLIC BLOOD PRESSURE: 121 MMHG | BODY MASS INDEX: 47.12 KG/M2 | HEIGHT: 60 IN

## 2022-06-13 DIAGNOSIS — I10 PRIMARY HYPERTENSION: ICD-10-CM

## 2022-06-13 DIAGNOSIS — E66.01 MORBID OBESITY WITH BMI OF 45.0-49.9, ADULT (HCC): ICD-10-CM

## 2022-06-13 DIAGNOSIS — F32.A ANXIETY AND DEPRESSION: ICD-10-CM

## 2022-06-13 DIAGNOSIS — J18.9 COMMUNITY ACQUIRED PNEUMONIA OF LEFT LOWER LOBE OF LUNG: Primary | ICD-10-CM

## 2022-06-13 DIAGNOSIS — J45.31 MILD PERSISTENT ASTHMA WITH ACUTE EXACERBATION: ICD-10-CM

## 2022-06-13 DIAGNOSIS — F41.1 GAD (GENERALIZED ANXIETY DISORDER): ICD-10-CM

## 2022-06-13 DIAGNOSIS — C55 MALIGNANT NEOPLASM OF UTERUS, UNSPECIFIED SITE (HCC): ICD-10-CM

## 2022-06-13 DIAGNOSIS — F41.9 ANXIETY AND DEPRESSION: ICD-10-CM

## 2022-06-13 PROCEDURE — 99213 OFFICE O/P EST LOW 20 MIN: CPT | Performed by: FAMILY MEDICINE

## 2022-06-13 RX ORDER — LEVOFLOXACIN 500 MG/1
500 TABLET, FILM COATED ORAL DAILY
Qty: 10 TABLET | Refills: 0 | Status: SHIPPED | OUTPATIENT
Start: 2022-06-13 | End: 2022-06-23

## 2022-06-13 RX ORDER — ALBUTEROL SULFATE 90 UG/1
2 AEROSOL, METERED RESPIRATORY (INHALATION) 4 TIMES DAILY PRN
Qty: 54 G | Refills: 1 | Status: SHIPPED | OUTPATIENT
Start: 2022-06-13 | End: 2022-10-03 | Stop reason: SDUPTHER
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
## 2022-06-14 ENCOUNTER — HOSPITAL ENCOUNTER (OUTPATIENT)
Dept: GENERAL RADIOLOGY | Age: 62
Discharge: HOME | End: 2022-06-17
Payer: COMMERCIAL

## 2022-06-14 DIAGNOSIS — J18.9 COMMUNITY ACQUIRED PNEUMONIA OF LEFT LOWER LOBE OF LUNG: ICD-10-CM

## 2022-06-14 DIAGNOSIS — J45.31 MILD PERSISTENT ASTHMA WITH ACUTE EXACERBATION: ICD-10-CM

## 2022-06-14 PROCEDURE — 71046 X-RAY EXAM CHEST 2 VIEWS: CPT

## 2022-06-21 ENCOUNTER — TELEPHONE (OUTPATIENT)
Dept: PRIMARY CARE CLINIC | Facility: CLINIC | Age: 62
End: 2022-06-21

## 2022-06-21 NOTE — TELEPHONE ENCOUNTER
Patient called requesting me to submit authorization for her to see cardiologist and pulmonary. I submitted PA to insurance. She also complains of blisters/bumps in mouth due to antibitoic.  She has 2 pills left

## 2022-06-22 ENCOUNTER — TELEPHONE (OUTPATIENT)
Dept: PRIMARY CARE CLINIC | Facility: CLINIC | Age: 62
End: 2022-06-22

## 2022-09-13 ENCOUNTER — TELEPHONE (OUTPATIENT)
Dept: PRIMARY CARE CLINIC | Facility: CLINIC | Age: 62
End: 2022-09-13

## 2022-09-13 NOTE — TELEPHONE ENCOUNTER
Can you call the pharmacy and let them know what kind of pneumonia shot she received and when she got it

## 2022-10-03 ENCOUNTER — OFFICE VISIT (OUTPATIENT)
Dept: PRIMARY CARE CLINIC | Facility: CLINIC | Age: 62
End: 2022-10-03
Payer: COMMERCIAL

## 2022-10-03 VITALS
WEIGHT: 243.2 LBS | HEART RATE: 62 BPM | HEIGHT: 60 IN | TEMPERATURE: 97.9 F | SYSTOLIC BLOOD PRESSURE: 134 MMHG | BODY MASS INDEX: 47.74 KG/M2 | OXYGEN SATURATION: 93 % | DIASTOLIC BLOOD PRESSURE: 94 MMHG

## 2022-10-03 DIAGNOSIS — F41.9 ANXIETY AND DEPRESSION: ICD-10-CM

## 2022-10-03 DIAGNOSIS — R60.0 BILATERAL LOWER EXTREMITY EDEMA: ICD-10-CM

## 2022-10-03 DIAGNOSIS — F32.A ANXIETY AND DEPRESSION: ICD-10-CM

## 2022-10-03 DIAGNOSIS — I10 PRIMARY HYPERTENSION: ICD-10-CM

## 2022-10-03 DIAGNOSIS — E66.01 MORBID OBESITY WITH BMI OF 45.0-49.9, ADULT (HCC): ICD-10-CM

## 2022-10-03 DIAGNOSIS — E78.2 MIXED HYPERLIPIDEMIA: ICD-10-CM

## 2022-10-03 DIAGNOSIS — J45.30 MILD PERSISTENT ASTHMA, UNSPECIFIED WHETHER COMPLICATED: Primary | ICD-10-CM

## 2022-10-03 DIAGNOSIS — Z86.39 HISTORY OF LOW POTASSIUM: ICD-10-CM

## 2022-10-03 PROCEDURE — 99214 OFFICE O/P EST MOD 30 MIN: CPT | Performed by: FAMILY MEDICINE

## 2022-10-03 RX ORDER — ESCITALOPRAM OXALATE 5 MG/1
5 TABLET ORAL DAILY
Qty: 90 TABLET | Refills: 3 | Status: SHIPPED | OUTPATIENT
Start: 2022-10-03

## 2022-10-03 RX ORDER — POTASSIUM CHLORIDE 750 MG/1
TABLET, EXTENDED RELEASE ORAL
Qty: 180 TABLET | Refills: 3 | Status: SHIPPED | OUTPATIENT
Start: 2022-10-03

## 2022-10-03 RX ORDER — ROSUVASTATIN CALCIUM 20 MG/1
20 TABLET, COATED ORAL DAILY
Qty: 90 TABLET | Refills: 3 | Status: SHIPPED | OUTPATIENT
Start: 2022-10-03

## 2022-10-03 RX ORDER — HYDROCHLOROTHIAZIDE 12.5 MG/1
12.5 CAPSULE, GELATIN COATED ORAL EVERY MORNING
Qty: 90 CAPSULE | Refills: 3 | Status: SHIPPED | OUTPATIENT
Start: 2022-10-03

## 2022-10-03 RX ORDER — VALSARTAN AND HYDROCHLOROTHIAZIDE 320; 25 MG/1; MG/1
1 TABLET, FILM COATED ORAL DAILY
Qty: 90 TABLET | Refills: 3 | Status: SHIPPED | OUTPATIENT
Start: 2022-10-03

## 2022-10-03 RX ORDER — ALBUTEROL SULFATE 90 UG/1
2 AEROSOL, METERED RESPIRATORY (INHALATION) 4 TIMES DAILY PRN
Qty: 54 G | Refills: 1 | Status: SHIPPED | OUTPATIENT
Start: 2022-10-03

## 2022-10-03 ASSESSMENT — PATIENT HEALTH QUESTIONNAIRE - PHQ9
SUM OF ALL RESPONSES TO PHQ QUESTIONS 1-9: 0
SUM OF ALL RESPONSES TO PHQ9 QUESTIONS 1 & 2: 0
SUM OF ALL RESPONSES TO PHQ QUESTIONS 1-9: 0
SUM OF ALL RESPONSES TO PHQ QUESTIONS 1-9: 0
1. LITTLE INTEREST OR PLEASURE IN DOING THINGS: 0
2. FEELING DOWN, DEPRESSED OR HOPELESS: 0
SUM OF ALL RESPONSES TO PHQ QUESTIONS 1-9: 0
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
## 2022-10-04 PROBLEM — Z86.39 HISTORY OF LOW POTASSIUM: Status: ACTIVE | Noted: 2022-10-04

## 2022-10-04 PROBLEM — J18.9 COMMUNITY ACQUIRED PNEUMONIA OF LEFT LOWER LOBE OF LUNG: Status: ACTIVE | Noted: 2022-10-04

## 2022-10-04 PROBLEM — R60.0 BILATERAL LOWER EXTREMITY EDEMA: Status: ACTIVE | Noted: 2022-10-04

## 2022-11-17 DIAGNOSIS — J18.9 COMMUNITY ACQUIRED PNEUMONIA OF LEFT LOWER LOBE OF LUNG: Primary | ICD-10-CM

## 2022-11-17 DIAGNOSIS — J45.30 MILD PERSISTENT ASTHMA WITHOUT COMPLICATION: ICD-10-CM

## 2022-11-22 ENCOUNTER — OFFICE VISIT (OUTPATIENT)
Dept: PULMONOLOGY | Age: 62
End: 2022-11-22
Payer: COMMERCIAL

## 2022-11-22 ENCOUNTER — TELEPHONE (OUTPATIENT)
Dept: PULMONOLOGY | Age: 62
End: 2022-11-22

## 2022-11-22 ENCOUNTER — HOSPITAL ENCOUNTER (OUTPATIENT)
Dept: GENERAL RADIOLOGY | Age: 62
Discharge: HOME | End: 2022-11-25
Payer: COMMERCIAL

## 2022-11-22 VITALS
SYSTOLIC BLOOD PRESSURE: 126 MMHG | TEMPERATURE: 97.6 F | HEART RATE: 72 BPM | RESPIRATION RATE: 18 BRPM | DIASTOLIC BLOOD PRESSURE: 78 MMHG | WEIGHT: 234.5 LBS | BODY MASS INDEX: 46.04 KG/M2 | HEIGHT: 60 IN | OXYGEN SATURATION: 97 %

## 2022-11-22 DIAGNOSIS — J18.9 COMMUNITY ACQUIRED PNEUMONIA OF LEFT LOWER LOBE OF LUNG: ICD-10-CM

## 2022-11-22 DIAGNOSIS — J31.0 CHRONIC RHINITIS: ICD-10-CM

## 2022-11-22 DIAGNOSIS — Z87.891 HISTORY OF CIGARETTE SMOKING: ICD-10-CM

## 2022-11-22 DIAGNOSIS — J45.30 MILD PERSISTENT ASTHMA WITHOUT COMPLICATION: ICD-10-CM

## 2022-11-22 DIAGNOSIS — R06.02 SHORTNESS OF BREATH: Primary | ICD-10-CM

## 2022-11-22 LAB
EXPIRATORY TIME: NORMAL
FEF 25-75% %PRED-PRE: NORMAL
FEF 25-75% PRED: NORMAL
FEF 25-75%-PRE: NORMAL
FEV1 %PRED-PRE: NORMAL
FEV1 PRED: 2.24 L
FEV1/FVC %PRED-PRE: 91 %
FEV1/FVC PRED: 106 %
FEV1/FVC: NORMAL
FEV1: NORMAL
FVC %PRED-PRE: 89 %
FVC PRED: NORMAL
FVC: 2.46 L
PEF %PRED-PRE: NORMAL
PEF PRED: NORMAL
PEF-PRE: NORMAL

## 2022-11-22 PROCEDURE — 3078F DIAST BP <80 MM HG: CPT | Performed by: INTERNAL MEDICINE

## 2022-11-22 PROCEDURE — 94010 BREATHING CAPACITY TEST: CPT | Performed by: INTERNAL MEDICINE

## 2022-11-22 PROCEDURE — 99204 OFFICE O/P NEW MOD 45 MIN: CPT | Performed by: INTERNAL MEDICINE

## 2022-11-22 PROCEDURE — 71046 X-RAY EXAM CHEST 2 VIEWS: CPT

## 2022-11-22 PROCEDURE — 3074F SYST BP LT 130 MM HG: CPT | Performed by: INTERNAL MEDICINE

## 2022-11-22 PROCEDURE — 99406 BEHAV CHNG SMOKING 3-10 MIN: CPT | Performed by: INTERNAL MEDICINE

## 2022-11-22 ASSESSMENT — PULMONARY FUNCTION TESTS
FVC_PERCENT_PREDICTED_PRE: 89
FEV1_PREDICTED: 2.24
FEV1/FVC_PERCENT_PREDICTED_PRE: 91
FVC: 2.46
FEV1/FVC_PREDICTED: 106
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
## 2022-11-30 ENCOUNTER — OFFICE VISIT (OUTPATIENT)
Dept: PRIMARY CARE CLINIC | Facility: CLINIC | Age: 62
End: 2022-11-30
Payer: COMMERCIAL

## 2022-11-30 VITALS
HEIGHT: 60 IN | BODY MASS INDEX: 46.33 KG/M2 | TEMPERATURE: 97.3 F | DIASTOLIC BLOOD PRESSURE: 84 MMHG | SYSTOLIC BLOOD PRESSURE: 135 MMHG | HEART RATE: 69 BPM | OXYGEN SATURATION: 99 % | WEIGHT: 236 LBS

## 2022-11-30 DIAGNOSIS — E66.01 MORBID OBESITY WITH BMI OF 45.0-49.9, ADULT (HCC): ICD-10-CM

## 2022-11-30 DIAGNOSIS — K57.32 DIVERTICULITIS OF LARGE INTESTINE WITHOUT PERFORATION OR ABSCESS WITHOUT BLEEDING: Primary | ICD-10-CM

## 2022-11-30 DIAGNOSIS — J40 BRONCHITIS: ICD-10-CM

## 2022-11-30 DIAGNOSIS — Z87.891 HISTORY OF CIGARETTE SMOKING: ICD-10-CM

## 2022-11-30 DIAGNOSIS — I10 PRIMARY HYPERTENSION: ICD-10-CM

## 2022-11-30 DIAGNOSIS — F41.1 GAD (GENERALIZED ANXIETY DISORDER): ICD-10-CM

## 2022-11-30 PROCEDURE — 3074F SYST BP LT 130 MM HG: CPT | Performed by: FAMILY MEDICINE

## 2022-11-30 PROCEDURE — 3078F DIAST BP <80 MM HG: CPT | Performed by: FAMILY MEDICINE

## 2022-11-30 PROCEDURE — 99214 OFFICE O/P EST MOD 30 MIN: CPT | Performed by: FAMILY MEDICINE

## 2022-11-30 RX ORDER — GUAIFENESIN AND CODEINE PHOSPHATE 100; 10 MG/5ML; MG/5ML
5 SOLUTION ORAL EVERY 4 HOURS PRN
Qty: 120 ML | Refills: 2 | Status: SHIPPED | OUTPATIENT
Start: 2022-11-30 | End: 2022-12-07

## 2022-11-30 RX ORDER — LEVOFLOXACIN 500 MG/1
500 TABLET, FILM COATED ORAL DAILY
Qty: 10 TABLET | Refills: 0 | Status: SHIPPED | OUTPATIENT
Start: 2022-11-30 | End: 2022-12-10

## 2022-11-30 ASSESSMENT — PATIENT HEALTH QUESTIONNAIRE - PHQ9
2. FEELING DOWN, DEPRESSED OR HOPELESS: 1
SUM OF ALL RESPONSES TO PHQ QUESTIONS 1-9: 2
SUM OF ALL RESPONSES TO PHQ QUESTIONS 1-9: 2
SUM OF ALL RESPONSES TO PHQ9 QUESTIONS 1 & 2: 2
SUM OF ALL RESPONSES TO PHQ QUESTIONS 1-9: 2
SUM OF ALL RESPONSES TO PHQ QUESTIONS 1-9: 2
1. LITTLE INTEREST OR PLEASURE IN DOING THINGS: 1
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
## 2022-12-07 ENCOUNTER — TELEPHONE (OUTPATIENT)
Dept: PRIMARY CARE CLINIC | Facility: CLINIC | Age: 62
End: 2022-12-07

## 2022-12-07 DIAGNOSIS — J39.8 CONGESTION OF UPPER RESPIRATORY TRACT: Primary | ICD-10-CM

## 2022-12-07 RX ORDER — AZITHROMYCIN 500 MG/1
500 TABLET, FILM COATED ORAL DAILY
Qty: 6 TABLET | Refills: 0 | Status: SHIPPED | OUTPATIENT
Start: 2022-12-07 | End: 2022-12-13

## 2022-12-07 NOTE — TELEPHONE ENCOUNTER
Pt was seen 11/30 and was prescribed medication for her cough and states she is not any better and is concern she is worse and has pneumonia. Pt also would like for provider to prescribed something else to see if she can get any better.

## 2023-01-30 DIAGNOSIS — Z13.228 SCREENING FOR PHENYLKETONURIA (PKU): ICD-10-CM

## 2023-01-30 DIAGNOSIS — R79.9 ABNORMAL BLOOD CHEMISTRY: ICD-10-CM

## 2023-01-30 DIAGNOSIS — Z13.6 SCREENING FOR ISCHEMIC HEART DISEASE: ICD-10-CM

## 2023-01-30 DIAGNOSIS — R53.82 CHRONIC FATIGUE: ICD-10-CM

## 2023-01-30 DIAGNOSIS — R79.9 ABNORMAL BLOOD CHEMISTRY: Primary | ICD-10-CM

## 2023-01-30 LAB
ALBUMIN SERPL-MCNC: 3.6 G/DL (ref 3.2–4.6)
ALBUMIN/GLOB SERPL: 1.1 (ref 0.4–1.6)
ALP SERPL-CCNC: 66 U/L (ref 50–136)
ALT SERPL-CCNC: 20 U/L (ref 12–65)
ANION GAP SERPL CALC-SCNC: 9 MMOL/L (ref 2–11)
AST SERPL-CCNC: 15 U/L (ref 15–37)
BASOPHILS # BLD: 0.1 K/UL (ref 0–0.2)
BASOPHILS NFR BLD: 1 % (ref 0–2)
BILIRUB SERPL-MCNC: 0.7 MG/DL (ref 0.2–1.1)
BUN SERPL-MCNC: 14 MG/DL (ref 8–23)
CALCIUM SERPL-MCNC: 8.8 MG/DL (ref 8.3–10.4)
CHLORIDE SERPL-SCNC: 105 MMOL/L (ref 101–110)
CHOLEST SERPL-MCNC: 121 MG/DL
CO2 SERPL-SCNC: 30 MMOL/L (ref 21–32)
CREAT SERPL-MCNC: 0.9 MG/DL (ref 0.6–1)
DIFFERENTIAL METHOD BLD: ABNORMAL
EOSINOPHIL # BLD: 0.2 K/UL (ref 0–0.8)
EOSINOPHIL NFR BLD: 3 % (ref 0.5–7.8)
ERYTHROCYTE [DISTWIDTH] IN BLOOD BY AUTOMATED COUNT: 13.2 % (ref 11.9–14.6)
GLOBULIN SER CALC-MCNC: 3.4 G/DL (ref 2.8–4.5)
GLUCOSE SERPL-MCNC: 94 MG/DL (ref 65–100)
HCT VFR BLD AUTO: 47.3 % (ref 35.8–46.3)
HDLC SERPL-MCNC: 54 MG/DL (ref 40–60)
HDLC SERPL: 2.2
HGB BLD-MCNC: 15.6 G/DL (ref 11.7–15.4)
IMM GRANULOCYTES # BLD AUTO: 0 K/UL (ref 0–0.5)
IMM GRANULOCYTES NFR BLD AUTO: 0 % (ref 0–5)
LDLC SERPL CALC-MCNC: 41 MG/DL
LYMPHOCYTES # BLD: 2.1 K/UL (ref 0.5–4.6)
LYMPHOCYTES NFR BLD: 29 % (ref 13–44)
MCH RBC QN AUTO: 30.6 PG (ref 26.1–32.9)
MCHC RBC AUTO-ENTMCNC: 33 G/DL (ref 31.4–35)
MCV RBC AUTO: 92.7 FL (ref 82–102)
MONOCYTES # BLD: 0.6 K/UL (ref 0.1–1.3)
MONOCYTES NFR BLD: 9 % (ref 4–12)
NEUTS SEG # BLD: 4.2 K/UL (ref 1.7–8.2)
NEUTS SEG NFR BLD: 58 % (ref 43–78)
NRBC # BLD: 0 K/UL (ref 0–0.2)
PLATELET # BLD AUTO: 262 K/UL (ref 150–450)
PMV BLD AUTO: 10.7 FL (ref 9.4–12.3)
POTASSIUM SERPL-SCNC: 3.3 MMOL/L (ref 3.5–5.1)
PROT SERPL-MCNC: 7 G/DL (ref 6.3–8.2)
RBC # BLD AUTO: 5.1 M/UL (ref 4.05–5.2)
SODIUM SERPL-SCNC: 144 MMOL/L (ref 133–143)
T4 FREE SERPL-MCNC: 1.1 NG/DL (ref 0.78–1.46)
TRIGL SERPL-MCNC: 130 MG/DL (ref 35–150)
TSH, 3RD GENERATION: 1.9 UIU/ML (ref 0.36–3.74)
VLDLC SERPL CALC-MCNC: 26 MG/DL (ref 6–23)
WBC # BLD AUTO: 7.2 K/UL (ref 4.3–11.1)

## 2023-02-06 ENCOUNTER — OFFICE VISIT (OUTPATIENT)
Dept: PRIMARY CARE CLINIC | Facility: CLINIC | Age: 63
End: 2023-02-06
Payer: COMMERCIAL

## 2023-02-06 VITALS
OXYGEN SATURATION: 94 % | HEIGHT: 60 IN | DIASTOLIC BLOOD PRESSURE: 88 MMHG | WEIGHT: 241 LBS | BODY MASS INDEX: 47.32 KG/M2 | TEMPERATURE: 97.3 F | HEART RATE: 65 BPM | SYSTOLIC BLOOD PRESSURE: 145 MMHG

## 2023-02-06 DIAGNOSIS — I10 PRIMARY HYPERTENSION: ICD-10-CM

## 2023-02-06 DIAGNOSIS — E78.2 MIXED HYPERLIPIDEMIA: ICD-10-CM

## 2023-02-06 DIAGNOSIS — F41.9 ANXIETY AND DEPRESSION: ICD-10-CM

## 2023-02-06 DIAGNOSIS — Z86.39 HISTORY OF LOW POTASSIUM: ICD-10-CM

## 2023-02-06 DIAGNOSIS — J31.0 CHRONIC RHINITIS: ICD-10-CM

## 2023-02-06 DIAGNOSIS — F32.A ANXIETY AND DEPRESSION: ICD-10-CM

## 2023-02-06 DIAGNOSIS — R60.0 BILATERAL LOWER EXTREMITY EDEMA: ICD-10-CM

## 2023-02-06 DIAGNOSIS — J18.9 COMMUNITY ACQUIRED PNEUMONIA OF RIGHT MIDDLE LOBE OF LUNG: Primary | ICD-10-CM

## 2023-02-06 PROCEDURE — 3074F SYST BP LT 130 MM HG: CPT | Performed by: FAMILY MEDICINE

## 2023-02-06 PROCEDURE — 99214 OFFICE O/P EST MOD 30 MIN: CPT | Performed by: FAMILY MEDICINE

## 2023-02-06 PROCEDURE — 3078F DIAST BP <80 MM HG: CPT | Performed by: FAMILY MEDICINE

## 2023-02-06 RX ORDER — HYDROCHLOROTHIAZIDE 12.5 MG/1
12.5 CAPSULE, GELATIN COATED ORAL EVERY MORNING
Qty: 90 CAPSULE | Refills: 3 | Status: SHIPPED | OUTPATIENT
Start: 2023-02-06

## 2023-02-06 RX ORDER — VALSARTAN AND HYDROCHLOROTHIAZIDE 320; 25 MG/1; MG/1
1 TABLET, FILM COATED ORAL DAILY
Qty: 90 TABLET | Refills: 3 | Status: SHIPPED | OUTPATIENT
Start: 2023-02-06

## 2023-02-06 RX ORDER — GUAIFENESIN AND CODEINE PHOSPHATE 100; 10 MG/5ML; MG/5ML
5 SOLUTION ORAL EVERY 4 HOURS PRN
Qty: 120 ML | Refills: 0 | Status: SHIPPED | OUTPATIENT
Start: 2023-02-06 | End: 2023-02-09

## 2023-02-06 RX ORDER — AZITHROMYCIN 500 MG/1
500 TABLET, FILM COATED ORAL DAILY
Qty: 6 TABLET | Refills: 0 | Status: SHIPPED | OUTPATIENT
Start: 2023-02-06 | End: 2023-02-12

## 2023-02-06 RX ORDER — AZELASTINE 1 MG/ML
1 SPRAY, METERED NASAL 2 TIMES DAILY
Qty: 30 ML | Refills: 5 | Status: SHIPPED | OUTPATIENT
Start: 2023-02-06

## 2023-02-06 RX ORDER — ESCITALOPRAM OXALATE 5 MG/1
5 TABLET ORAL DAILY
Qty: 90 TABLET | Refills: 3 | Status: SHIPPED | OUTPATIENT
Start: 2023-02-06

## 2023-02-06 RX ORDER — POTASSIUM CHLORIDE 750 MG/1
TABLET, EXTENDED RELEASE ORAL
Qty: 180 TABLET | Refills: 3 | Status: SHIPPED | OUTPATIENT
Start: 2023-02-06

## 2023-02-06 RX ORDER — AMLODIPINE BESYLATE 2.5 MG/1
2.5 TABLET ORAL DAILY
Qty: 90 TABLET | Refills: 1 | Status: SHIPPED | OUTPATIENT
Start: 2023-02-06

## 2023-02-06 RX ORDER — ROSUVASTATIN CALCIUM 20 MG/1
20 TABLET, COATED ORAL DAILY
Qty: 90 TABLET | Refills: 3 | Status: SHIPPED | OUTPATIENT
Start: 2023-02-06

## 2023-02-06 SDOH — ECONOMIC STABILITY: FOOD INSECURITY: WITHIN THE PAST 12 MONTHS, YOU WORRIED THAT YOUR FOOD WOULD RUN OUT BEFORE YOU GOT MONEY TO BUY MORE.: PATIENT DECLINED

## 2023-02-06 SDOH — ECONOMIC STABILITY: INCOME INSECURITY: HOW HARD IS IT FOR YOU TO PAY FOR THE VERY BASICS LIKE FOOD, HOUSING, MEDICAL CARE, AND HEATING?: PATIENT DECLINED

## 2023-02-06 SDOH — ECONOMIC STABILITY: FOOD INSECURITY: WITHIN THE PAST 12 MONTHS, THE FOOD YOU BOUGHT JUST DIDN'T LAST AND YOU DIDN'T HAVE MONEY TO GET MORE.: PATIENT DECLINED

## 2023-02-06 SDOH — ECONOMIC STABILITY: HOUSING INSECURITY
IN THE LAST 12 MONTHS, WAS THERE A TIME WHEN YOU DID NOT HAVE A STEADY PLACE TO SLEEP OR SLEPT IN A SHELTER (INCLUDING NOW)?: PATIENT REFUSED

## 2023-02-06 ASSESSMENT — PATIENT HEALTH QUESTIONNAIRE - PHQ9
SUM OF ALL RESPONSES TO PHQ QUESTIONS 1-9: 0
2. FEELING DOWN, DEPRESSED OR HOPELESS: 0
SUM OF ALL RESPONSES TO PHQ9 QUESTIONS 1 & 2: 0
1. LITTLE INTEREST OR PLEASURE IN DOING THINGS: 0
SUM OF ALL RESPONSES TO PHQ QUESTIONS 1-9: 0
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
## 2023-02-07 PROCEDURE — 90471 IMMUNIZATION ADMIN: CPT | Performed by: FAMILY MEDICINE

## 2023-02-07 PROCEDURE — 90677 PCV20 VACCINE IM: CPT | Performed by: FAMILY MEDICINE

## 2023-02-20 ENCOUNTER — TELEPHONE (OUTPATIENT)
Dept: PRIMARY CARE CLINIC | Facility: CLINIC | Age: 63
End: 2023-02-20
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
## 2023-02-22 ENCOUNTER — OFFICE VISIT (OUTPATIENT)
Dept: PRIMARY CARE CLINIC | Facility: CLINIC | Age: 63
End: 2023-02-22

## 2023-02-22 VITALS
SYSTOLIC BLOOD PRESSURE: 111 MMHG | HEIGHT: 60 IN | BODY MASS INDEX: 47.32 KG/M2 | OXYGEN SATURATION: 96 % | TEMPERATURE: 97.5 F | HEART RATE: 63 BPM | WEIGHT: 241 LBS | DIASTOLIC BLOOD PRESSURE: 74 MMHG

## 2023-02-22 DIAGNOSIS — F32.A ANXIETY AND DEPRESSION: ICD-10-CM

## 2023-02-22 DIAGNOSIS — R09.89 CHEST CONGESTION: Primary | ICD-10-CM

## 2023-02-22 DIAGNOSIS — J18.9 COMMUNITY ACQUIRED PNEUMONIA OF LEFT UPPER LOBE OF LUNG: ICD-10-CM

## 2023-02-22 DIAGNOSIS — I10 PRIMARY HYPERTENSION: ICD-10-CM

## 2023-02-22 DIAGNOSIS — F41.9 ANXIETY AND DEPRESSION: ICD-10-CM

## 2023-02-22 DIAGNOSIS — Z63.0 STRESS DUE TO MARITAL PROBLEMS: ICD-10-CM

## 2023-02-22 LAB
EXP DATE SOLUTION: NORMAL
EXP DATE SWAB: NORMAL
EXPIRATION DATE: NORMAL
LOT NUMBER POC: NORMAL
LOT NUMBER SOLUTION: NORMAL
LOT NUMBER SWAB: NORMAL
SARS-COV-2 RNA, POC: NEGATIVE

## 2023-02-22 RX ORDER — PHENOL 1.4 %
AEROSOL, SPRAY (ML) MUCOUS MEMBRANE
COMMUNITY

## 2023-02-22 RX ORDER — METHYLDOPA 250 MG
TABLET ORAL
COMMUNITY

## 2023-02-22 RX ORDER — LEVOFLOXACIN 500 MG/1
500 TABLET, FILM COATED ORAL DAILY
Qty: 10 TABLET | Refills: 0 | Status: SHIPPED | OUTPATIENT
Start: 2023-02-22 | End: 2023-03-04

## 2023-02-22 RX ORDER — HYDROCODONE POLISTIREX AND CHLORPHENIRAMINE POLISTIREX 10; 8 MG/5ML; MG/5ML
5 SUSPENSION, EXTENDED RELEASE ORAL EVERY 12 HOURS PRN
Qty: 100 ML | Refills: 0 | Status: SHIPPED | OUTPATIENT
Start: 2023-02-22 | End: 2023-03-24

## 2023-02-22 SDOH — SOCIAL STABILITY - SOCIAL INSECURITY: PROBLEMS IN RELATIONSHIP WITH SPOUSE OR PARTNER: Z63.0
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
## 2023-03-08 ENCOUNTER — TELEPHONE (OUTPATIENT)
Dept: PRIMARY CARE CLINIC | Facility: CLINIC | Age: 63
End: 2023-03-08

## 2023-03-08 NOTE — TELEPHONE ENCOUNTER
Patient call and stated that she has a cough, fever, dizziness and that she has previously had pneumonia. She stated that her sister EMS but she refused to go because she does not want a bill and because the hospital is where are the germs are and if she is sick she wants the doctor to tell her she is sick.  She can be reached at   [phone]

## 2023-03-08 NOTE — TELEPHONE ENCOUNTER
Spoke to patient. She is still sick. She was given antibiotics recently and is now finished with medication. She still has a cough, and c/o diziness and feeling hot and sweaty. Sister called EMS but patient refused to go due to cost and germs at hospital. She is scheduled for an office visit 3/30/22 with Dr Fausto Ty. Patient denies any trouble breathing or SOB. I advised patient that this may be some lingering effects of the pneumonia.  Please advise

## 2023-03-09 ENCOUNTER — TELEPHONE (OUTPATIENT)
Dept: PRIMARY CARE CLINIC | Facility: CLINIC | Age: 63
End: 2023-03-09

## 2023-03-28 ENCOUNTER — TELEPHONE (OUTPATIENT)
Dept: PRIMARY CARE CLINIC | Facility: CLINIC | Age: 63
End: 2023-03-28

## 2023-03-28 NOTE — TELEPHONE ENCOUNTER
Pt was a return from nurse triage for chest pain and numbness in both her arms. I recommended for her to go to the er.  She states that her  won't let her    I wasn't sure how else to proceed

## 2023-04-03 ENCOUNTER — OFFICE VISIT (OUTPATIENT)
Dept: PRIMARY CARE CLINIC | Facility: CLINIC | Age: 63
End: 2023-04-03
Payer: COMMERCIAL

## 2023-04-03 VITALS
BODY MASS INDEX: 47.71 KG/M2 | SYSTOLIC BLOOD PRESSURE: 155 MMHG | HEIGHT: 60 IN | WEIGHT: 243 LBS | OXYGEN SATURATION: 95 % | HEART RATE: 68 BPM | TEMPERATURE: 97 F | DIASTOLIC BLOOD PRESSURE: 94 MMHG

## 2023-04-03 DIAGNOSIS — T78.40XA ALLERGY, INITIAL ENCOUNTER: ICD-10-CM

## 2023-04-03 DIAGNOSIS — J06.9 URI WITH COUGH AND CONGESTION: ICD-10-CM

## 2023-04-03 DIAGNOSIS — R30.0 DYSURIA: ICD-10-CM

## 2023-04-03 DIAGNOSIS — R07.9 CHEST PAIN, UNSPECIFIED TYPE: Primary | ICD-10-CM

## 2023-04-03 DIAGNOSIS — B37.9 YEAST INFECTION: ICD-10-CM

## 2023-04-03 DIAGNOSIS — R07.9 CHEST PAIN AT REST: Primary | ICD-10-CM

## 2023-04-03 PROCEDURE — 3077F SYST BP >= 140 MM HG: CPT | Performed by: FAMILY MEDICINE

## 2023-04-03 PROCEDURE — 93000 ELECTROCARDIOGRAM COMPLETE: CPT | Performed by: FAMILY MEDICINE

## 2023-04-03 PROCEDURE — 99214 OFFICE O/P EST MOD 30 MIN: CPT | Performed by: FAMILY MEDICINE

## 2023-04-03 PROCEDURE — 3080F DIAST BP >= 90 MM HG: CPT | Performed by: FAMILY MEDICINE

## 2023-04-03 RX ORDER — NITROGLYCERIN 0.4 MG/1
0.4 TABLET SUBLINGUAL EVERY 5 MIN PRN
Qty: 25 TABLET | Refills: 3 | Status: SHIPPED | OUTPATIENT
Start: 2023-04-03

## 2023-04-03 RX ORDER — ASPIRIN 81 MG/1
TABLET ORAL
Qty: 90 TABLET | Refills: 1 | Status: SHIPPED | OUTPATIENT
Start: 2023-04-03

## 2023-04-03 RX ORDER — LEVOFLOXACIN 500 MG/1
500 TABLET, FILM COATED ORAL DAILY
Qty: 10 TABLET | Refills: 0 | Status: SHIPPED | OUTPATIENT
Start: 2023-04-03 | End: 2023-04-13

## 2023-04-03 RX ORDER — GUAIFENESIN AND CODEINE PHOSPHATE 100; 10 MG/5ML; MG/5ML
5 SOLUTION ORAL EVERY 4 HOURS PRN
Qty: 120 ML | Refills: 0 | Status: SHIPPED | OUTPATIENT
Start: 2023-04-03 | End: 2023-04-10

## 2023-04-03 RX ORDER — MONTELUKAST SODIUM 10 MG/1
10 TABLET ORAL DAILY
Qty: 30 TABLET | Refills: 3 | Status: SHIPPED | OUTPATIENT
Start: 2023-04-03

## 2023-04-03 ASSESSMENT — PATIENT HEALTH QUESTIONNAIRE - PHQ9
2. FEELING DOWN, DEPRESSED OR HOPELESS: 1
10. IF YOU CHECKED OFF ANY PROBLEMS, HOW DIFFICULT HAVE THESE PROBLEMS MADE IT FOR YOU TO DO YOUR WORK, TAKE CARE OF THINGS AT HOME, OR GET ALONG WITH OTHER PEOPLE: 0
4. FEELING TIRED OR HAVING LITTLE ENERGY: 0
SUM OF ALL RESPONSES TO PHQ9 QUESTIONS 1 & 2: 2
5. POOR APPETITE OR OVEREATING: 0
SUM OF ALL RESPONSES TO PHQ QUESTIONS 1-9: 2
1. LITTLE INTEREST OR PLEASURE IN DOING THINGS: 1
8. MOVING OR SPEAKING SO SLOWLY THAT OTHER PEOPLE COULD HAVE NOTICED. OR THE OPPOSITE, BEING SO FIGETY OR RESTLESS THAT YOU HAVE BEEN MOVING AROUND A LOT MORE THAN USUAL: 0
9. THOUGHTS THAT YOU WOULD BE BETTER OFF DEAD, OR OF HURTING YOURSELF: 0
SUM OF ALL RESPONSES TO PHQ QUESTIONS 1-9: 2
SUM OF ALL RESPONSES TO PHQ QUESTIONS 1-9: 2
6. FEELING BAD ABOUT YOURSELF - OR THAT YOU ARE A FAILURE OR HAVE LET YOURSELF OR YOUR FAMILY DOWN: 0
3. TROUBLE FALLING OR STAYING ASLEEP: 0
SUM OF ALL RESPONSES TO PHQ QUESTIONS 1-9: 2
7. TROUBLE CONCENTRATING ON THINGS, SUCH AS READING THE NEWSPAPER OR WATCHING TELEVISION: 0
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
## 2023-05-08 ENCOUNTER — OFFICE VISIT (OUTPATIENT)
Dept: PRIMARY CARE CLINIC | Facility: CLINIC | Age: 63
End: 2023-05-08
Payer: COMMERCIAL

## 2023-05-08 VITALS
TEMPERATURE: 97 F | BODY MASS INDEX: 48.1 KG/M2 | OXYGEN SATURATION: 97 % | HEIGHT: 60 IN | DIASTOLIC BLOOD PRESSURE: 89 MMHG | SYSTOLIC BLOOD PRESSURE: 114 MMHG | HEART RATE: 63 BPM | WEIGHT: 245 LBS

## 2023-05-08 DIAGNOSIS — J44.1 CHRONIC OBSTRUCTIVE PULMONARY DISEASE WITH ACUTE EXACERBATION (HCC): ICD-10-CM

## 2023-05-08 DIAGNOSIS — T78.40XA ALLERGY, INITIAL ENCOUNTER: ICD-10-CM

## 2023-05-08 DIAGNOSIS — R05.1 ACUTE COUGH: Primary | ICD-10-CM

## 2023-05-08 DIAGNOSIS — L08.9 SKIN INFECTION: ICD-10-CM

## 2023-05-08 DIAGNOSIS — Z12.11 ENCOUNTER FOR SCREENING COLONOSCOPY: ICD-10-CM

## 2023-05-08 DIAGNOSIS — I10 PRIMARY HYPERTENSION: ICD-10-CM

## 2023-05-08 DIAGNOSIS — Z87.891 HISTORY OF CIGARETTE SMOKING: ICD-10-CM

## 2023-05-08 DIAGNOSIS — J45.31 MILD PERSISTENT ASTHMA WITH ACUTE EXACERBATION: ICD-10-CM

## 2023-05-08 DIAGNOSIS — J06.9 URI WITH COUGH AND CONGESTION: ICD-10-CM

## 2023-05-08 PROCEDURE — 3074F SYST BP LT 130 MM HG: CPT | Performed by: FAMILY MEDICINE

## 2023-05-08 PROCEDURE — 3079F DIAST BP 80-89 MM HG: CPT | Performed by: FAMILY MEDICINE

## 2023-05-08 PROCEDURE — 99214 OFFICE O/P EST MOD 30 MIN: CPT | Performed by: FAMILY MEDICINE

## 2023-05-08 RX ORDER — FLUTICASONE PROPIONATE AND SALMETEROL 250; 50 UG/1; UG/1
1 POWDER RESPIRATORY (INHALATION) EVERY 12 HOURS
Qty: 60 EACH | Refills: 3 | Status: SHIPPED | OUTPATIENT
Start: 2023-05-08

## 2023-05-08 RX ORDER — LEVOFLOXACIN 500 MG/1
500 TABLET, FILM COATED ORAL DAILY
Qty: 10 TABLET | Refills: 0 | Status: SHIPPED | OUTPATIENT
Start: 2023-05-08 | End: 2023-05-18

## 2023-05-08 RX ORDER — BENZONATATE 100 MG/1
100 CAPSULE ORAL 3 TIMES DAILY PRN
Qty: 30 CAPSULE | Refills: 2 | Status: SHIPPED | OUTPATIENT
Start: 2023-05-08 | End: 2023-05-18

## 2023-05-08 RX ORDER — ALBUTEROL SULFATE 90 UG/1
2 AEROSOL, METERED RESPIRATORY (INHALATION) 4 TIMES DAILY PRN
Qty: 54 G | Refills: 1 | Status: SHIPPED | OUTPATIENT
Start: 2023-05-08
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
## 2023-05-10 ENCOUNTER — TELEPHONE (OUTPATIENT)
Dept: PRIMARY CARE CLINIC | Facility: CLINIC | Age: 63
End: 2023-05-10

## 2023-05-10 RX ORDER — ALBUTEROL SULFATE 2.5 MG/3ML
2.5 SOLUTION RESPIRATORY (INHALATION) 4 TIMES DAILY PRN
Qty: 120 EACH | Refills: 3 | Status: SHIPPED | OUTPATIENT
Start: 2023-05-10

## 2023-05-15 RX ORDER — MONTELUKAST SODIUM 10 MG/1
TABLET ORAL
Qty: 90 TABLET | Refills: 1 | OUTPATIENT
Start: 2023-05-15

## 2023-05-15 RX ORDER — AMLODIPINE BESYLATE 2.5 MG/1
TABLET ORAL
Qty: 90 TABLET | Refills: 1 | OUTPATIENT
Start: 2023-05-15

## 2023-05-31 DIAGNOSIS — R05.1 ACUTE COUGH: Primary | ICD-10-CM

## 2023-06-27 ENCOUNTER — TELEPHONE (OUTPATIENT)
Dept: PRIMARY CARE CLINIC | Facility: CLINIC | Age: 63
End: 2023-06-27

## 2023-06-28 DIAGNOSIS — R07.9 CHEST PAIN AT REST: ICD-10-CM

## 2023-06-28 RX ORDER — ASPIRIN 81 MG/1
TABLET ORAL
Qty: 60 TABLET | Refills: 2 | OUTPATIENT
Start: 2023-06-28

## 2023-08-03 ENCOUNTER — TELEPHONE (OUTPATIENT)
Dept: PRIMARY CARE CLINIC | Facility: CLINIC | Age: 63
End: 2023-08-03

## 2023-08-03 NOTE — TELEPHONE ENCOUNTER
----- Message from Vicki Jeff sent at 8/2/2023  3:54 PM EDT -----  Subject: Message to Provider    QUESTIONS  Information for Provider? Patient requests call back to let her know if   Dr. Jose C Bruce accepts Vee Mattson. Bharathi Pepper shows he is in network.   ---------------------------------------------------------------------------  --------------  Mukesh Saunemin Herminio  [phone]; OK to leave message on voicemail  ---------------------------------------------------------------------------  --------------  SCRIPT ANSWERS  Relationship to Patient?  Self

## 2023-08-04 ENCOUNTER — COMMUNITY OUTREACH (OUTPATIENT)
Dept: PRIMARY CARE CLINIC | Facility: CLINIC | Age: 63
End: 2023-08-04

## 2023-09-08 ENCOUNTER — TELEPHONE (OUTPATIENT)
Dept: FAMILY MEDICINE CLINIC | Facility: CLINIC | Age: 63
End: 2023-09-08
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
## 2024-01-30 DIAGNOSIS — R05.1 ACUTE COUGH: ICD-10-CM

## 2024-01-30 DIAGNOSIS — J44.1 CHRONIC OBSTRUCTIVE PULMONARY DISEASE WITH ACUTE EXACERBATION (HCC): ICD-10-CM

## 2024-01-30 DIAGNOSIS — Z87.891 HISTORY OF CIGARETTE SMOKING: ICD-10-CM

## 2024-01-30 DIAGNOSIS — J45.31 MILD PERSISTENT ASTHMA WITH ACUTE EXACERBATION: ICD-10-CM

## 2024-01-30 RX ORDER — FLUTICASONE PROPIONATE AND SALMETEROL 250; 50 UG/1; UG/1
POWDER RESPIRATORY (INHALATION)
Qty: 60 EACH | Refills: 1 | OUTPATIENT
Start: 2024-01-30

## 2024-03-03 DIAGNOSIS — F41.9 ANXIETY AND DEPRESSION: ICD-10-CM

## 2024-03-03 DIAGNOSIS — F32.A ANXIETY AND DEPRESSION: ICD-10-CM

## 2024-03-04 RX ORDER — ESCITALOPRAM OXALATE 5 MG/1
5 TABLET ORAL DAILY
Qty: 30 TABLET | Refills: 0 | Status: SHIPPED | OUTPATIENT
Start: 2024-03-04